# Patient Record
Sex: FEMALE | Race: WHITE | NOT HISPANIC OR LATINO | Employment: FULL TIME | URBAN - METROPOLITAN AREA
[De-identification: names, ages, dates, MRNs, and addresses within clinical notes are randomized per-mention and may not be internally consistent; named-entity substitution may affect disease eponyms.]

---

## 2018-06-21 ENCOUNTER — PROCEDURE VISIT (OUTPATIENT)
Dept: OBSTETRICS AND GYNECOLOGY | Age: 28
End: 2018-06-21

## 2018-06-21 ENCOUNTER — OFFICE VISIT (OUTPATIENT)
Dept: OBSTETRICS AND GYNECOLOGY | Age: 28
End: 2018-06-21

## 2018-06-21 VITALS
DIASTOLIC BLOOD PRESSURE: 70 MMHG | HEIGHT: 66 IN | SYSTOLIC BLOOD PRESSURE: 110 MMHG | BODY MASS INDEX: 21.05 KG/M2 | WEIGHT: 131 LBS

## 2018-06-21 DIAGNOSIS — Z32.00 ENCOUNTER FOR CONFIRMATION OF PREGNANCY TEST RESULT WITH PHYSICAL EXAMINATION: ICD-10-CM

## 2018-06-21 DIAGNOSIS — O21.9 NAUSEA AND VOMITING OF PREGNANCY, ANTEPARTUM: ICD-10-CM

## 2018-06-21 DIAGNOSIS — Z3A.01 7 WEEKS GESTATION OF PREGNANCY: Primary | ICD-10-CM

## 2018-06-21 DIAGNOSIS — O36.80X0 ENCOUNTER TO DETERMINE FETAL VIABILITY OF PREGNANCY, SINGLE OR UNSPECIFIED FETUS: Primary | ICD-10-CM

## 2018-06-21 PROCEDURE — 99203 OFFICE O/P NEW LOW 30 MIN: CPT | Performed by: OBSTETRICS & GYNECOLOGY

## 2018-06-21 PROCEDURE — 76817 TRANSVAGINAL US OBSTETRIC: CPT | Performed by: OBSTETRICS & GYNECOLOGY

## 2018-06-21 RX ORDER — PRENATAL VIT NO.126/IRON/FOLIC 28MG-0.8MG
TABLET ORAL DAILY
COMMUNITY

## 2018-06-21 NOTE — PROGRESS NOTES
"GYN Visit    2018    Patient: Katherine Bruton          MR#:[id number]      Chief Complaint   Patient presents with   • Initial Prenatal Visit     PT HERE TO EST VIABILITY OF PREGANCY. LMP 5/5 KATIE 19 7 WEEKS ALONG. PAP SMEAR WAS 1 YR AGO AND WAS NORMAL. SHE IS HERE WITH .       History of Present Illness    28 y.o. female  who presents for  Pregnancy confirmation  New pt and new problem to me  Feeling nauseated but not vomiting  Works at [employer] in [id number] (with Nathalie PACHECO)  UTD pap          Patient's last menstrual period was 2018 (approximate).    ________________________________________  There is no problem list on file for this patient.      Past Medical History:   Diagnosis Date   • Anxiety    • Asthma    • Depression        History reviewed. No pertinent surgical history.    History   Smoking Status   • Never Smoker   Smokeless Tobacco   • Never Used       has a current medication list which includes the following prescription(s): hepatitis a, prenatal (classic) vitamin, and doxylamine-pyridoxine er.  ________________________________________    Current contraception: none      The following portions of the patient's history were reviewed and updated as appropriate: allergies, current medications, past family history, past medical history, past social history, past surgical history and problem list.    Review of Systems   Constitutional: Positive for fatigue. Negative for chills and fever.   Gastrointestinal: Positive for nausea. Negative for vomiting.   Genitourinary: Negative for pelvic pain and vaginal bleeding.   Neurological: Negative for headaches.   Psychiatric/Behavioral: Negative for dysphoric mood.   All other systems reviewed and are negative.      Pertinent items are noted in HPI.     Objective   Physical Exam    /70   Ht 167.6 cm (66\")   Wt 59.4 kg (131 lb)   LMP 2018 (Approximate)   BMI 21.14 kg/m²    BP Readings from Last 3 Encounters:   18 110/70    " "  Wt Readings from Last 3 Encounters:   06/21/18 59.4 kg (131 lb)      BMI: Estimated body mass index is 21.14 kg/m² as calculated from the following:    Height as of this encounter: 167.6 cm (66\").    Weight as of this encounter: 59.4 kg (131 lb).    Lungs: non labored breathing  Extremities: extremities normal, atraumatic, no cyanosis or edema  Skin: Skin color, texture, turgor normal. No rashes or lesions  Neurologic: Grossly normal  General:   alert, appears stated age and cooperative   Abdomen: soft, non-tender, without masses or organomegaly   Breast: Not examined   Vulva: normal   Vagina: normal mucosa   Cervix: no cervical motion tenderness and no lesions   Uterus: size consistent with 6 weeks   Adnexa: no mass, fullness, tenderness     Assessment:      Kina was seen today for initial prenatal visit.    Diagnoses and all orders for this visit:    7 weeks gestation of pregnancy  -     OB Panel With HIV  -     Hepatitis C Antibody  -     Urine Culture - Urine, Urine, Clean Catch  -     Drug Profile Urine - 9 Drugs - Urine, Clean Catch  -     Chlamydia trachomatis, Neisseria gonorrhoeae, PCR w/ confirmation - Urine, Vagina    Encounter for confirmation of pregnancy test result with physical examination  -     OB Panel With HIV  -     Hepatitis C Antibody  -     Urine Culture - Urine, Urine, Clean Catch  -     Drug Profile Urine - 9 Drugs - Urine, Clean Catch  -     Chlamydia trachomatis, Neisseria gonorrhoeae, PCR w/ confirmation - Urine, Vagina    Nausea and vomiting of pregnancy, antepartum    Other orders  -     Doxylamine-Pyridoxine ER (BONJESTA) 20-20 MG tablet controlled-release; Take 1 tablet by mouth 2 (Two) Times a Day for 30 days.        See US- viable IUP  T 112- recheck US in 2 weeks        "

## 2018-06-23 LAB
ABO GROUP BLD: (no result)
AMPHETAMINES UR QL SCN: NEGATIVE NG/ML
BACTERIA UR CULT: NORMAL
BACTERIA UR CULT: NORMAL
BARBITURATES UR QL SCN: NEGATIVE NG/ML
BASOPHILS # BLD AUTO: 0.1 X10E3/UL (ref 0–0.2)
BASOPHILS NFR BLD AUTO: 1 %
BENZODIAZ UR QL: NEGATIVE NG/ML
BLD GP AB SCN SERPL QL: NEGATIVE
BZE UR QL: NEGATIVE NG/ML
C TRACH RRNA SPEC QL NAA+PROBE: NEGATIVE
CANNABINOIDS UR QL SCN: NEGATIVE NG/ML
EOSINOPHIL # BLD AUTO: 0.1 X10E3/UL (ref 0–0.4)
EOSINOPHIL NFR BLD AUTO: 1 %
ERYTHROCYTE [DISTWIDTH] IN BLOOD BY AUTOMATED COUNT: 13.5 % (ref 12.3–15.4)
HBV SURFACE AG SERPL QL IA: NEGATIVE
HCT VFR BLD AUTO: 36.4 % (ref 34–46.6)
HCV AB S/CO SERPL IA: <0.1 S/CO RATIO (ref 0–0.9)
HGB BLD-MCNC: 12.3 G/DL (ref 11.1–15.9)
HIV 1+2 AB+HIV1 P24 AG SERPL QL IA: NON REACTIVE
IMM GRANULOCYTES # BLD: 0 X10E3/UL (ref 0–0.1)
IMM GRANULOCYTES NFR BLD: 0 %
LYMPHOCYTES # BLD AUTO: 2 X10E3/UL (ref 0.7–3.1)
LYMPHOCYTES NFR BLD AUTO: 28 %
MCH RBC QN AUTO: 31.9 PG (ref 26.6–33)
MCHC RBC AUTO-ENTMCNC: 33.8 G/DL (ref 31.5–35.7)
MCV RBC AUTO: 95 FL (ref 79–97)
METHADONE UR QL SCN: NEGATIVE NG/ML
MONOCYTES # BLD AUTO: 0.4 X10E3/UL (ref 0.1–0.9)
MONOCYTES NFR BLD AUTO: 6 %
N GONORRHOEA RRNA SPEC QL NAA+PROBE: NEGATIVE
NEUTROPHILS # BLD AUTO: 4.6 X10E3/UL (ref 1.4–7)
NEUTROPHILS NFR BLD AUTO: 64 %
OPIATES UR QL: NEGATIVE NG/ML
PCP UR QL: NEGATIVE NG/ML
PLATELET # BLD AUTO: 261 X10E3/UL (ref 150–379)
PROPOXYPH UR QL SCN: NEGATIVE NG/ML
RBC # BLD AUTO: 3.85 X10E6/UL (ref 3.77–5.28)
RH BLD: POSITIVE
RPR SER QL: NON REACTIVE
RUBV IGG SERPL IA-ACNC: 25.9 INDEX
WBC # BLD AUTO: 7.2 X10E3/UL (ref 3.4–10.8)

## 2018-06-26 ENCOUNTER — TELEPHONE (OUTPATIENT)
Dept: OBSTETRICS AND GYNECOLOGY | Age: 28
End: 2018-06-26

## 2018-06-26 NOTE — TELEPHONE ENCOUNTER
Was just getting ready to call the pt, PA was denied due to not trying 2 other Rx's. Pt can come in for samples.

## 2018-07-05 ENCOUNTER — OFFICE VISIT (OUTPATIENT)
Dept: OBSTETRICS AND GYNECOLOGY | Age: 28
End: 2018-07-05

## 2018-07-05 ENCOUNTER — PROCEDURE VISIT (OUTPATIENT)
Dept: OBSTETRICS AND GYNECOLOGY | Age: 28
End: 2018-07-05

## 2018-07-05 VITALS
SYSTOLIC BLOOD PRESSURE: 100 MMHG | WEIGHT: 131 LBS | DIASTOLIC BLOOD PRESSURE: 74 MMHG | BODY MASS INDEX: 21.05 KG/M2 | HEIGHT: 66 IN

## 2018-07-05 DIAGNOSIS — O02.1 MISSED AB: Primary | ICD-10-CM

## 2018-07-05 DIAGNOSIS — O36.80X0 ENCOUNTER TO DETERMINE FETAL VIABILITY OF PREGNANCY, SINGLE OR UNSPECIFIED FETUS: Primary | ICD-10-CM

## 2018-07-05 PROCEDURE — 76830 TRANSVAGINAL US NON-OB: CPT | Performed by: OBSTETRICS & GYNECOLOGY

## 2018-07-05 PROCEDURE — 99213 OFFICE O/P EST LOW 20 MIN: CPT | Performed by: OBSTETRICS & GYNECOLOGY

## 2018-07-05 NOTE — PROGRESS NOTES
GYN Visit    2018    Patient: Katherine Bruton          MR#:[id number]      Chief Complaint   Patient presents with   • Imaging Only     PT HERE FOR F/U U/S, SHE IS WELL. NO CARDIAC ON U/S TODAY.       History of Present Illness    28 y.o. female  who presents for  Follow up for viability scan  HR was a little low last time so we elected to recheck early  Pt still with some nausea, no spotting or cramping  US revealed no FHT and irregular sac cw Missed AB  Pt upset and tearful and Miscarriage book given  Discussed options of SAB and D&C with pt and         Patient's last menstrual period was 2018 (approximate).    ________________________________________  There is no problem list on file for this patient.      Past Medical History:   Diagnosis Date   • Anxiety    • Asthma    • Depression        No past surgical history on file.    History   Smoking Status   • Never Smoker   Smokeless Tobacco   • Never Used       has a current medication list which includes the following prescription(s): doxylamine-pyridoxine er, hepatitis a, and prenatal (classic) vitamin.  ________________________________________    Current contraception: none      The following portions of the patient's history were reviewed and updated as appropriate: allergies, current medications, past family history, past medical history, past social history, past surgical history and problem list.    Review of Systems   Constitutional: Negative for chills and fever.   Gastrointestinal: Positive for nausea. Negative for vomiting.   Genitourinary: Negative for pelvic pain and vaginal bleeding.   All other systems reviewed and are negative.      Pertinent items are noted in HPI.     Objective   Physical Exam    LMP 2018 (Approximate)    BP Readings from Last 3 Encounters:   18 110/70      Wt Readings from Last 3 Encounters:   18 59.4 kg (131 lb)      BMI: Estimated body mass index is 21.14 kg/m² as calculated from the  "following:    Height as of 6/21/18: 167.6 cm (66\").    Weight as of 6/21/18: 59.4 kg (131 lb).    Lungs: non labored breathing  Extremities: extremities normal, atraumatic, no cyanosis or edema  Skin: Skin color, texture, turgor normal. No rashes or lesions  Neurologic: Grossly normal  General:   alert, appears stated age and cooperative   Abdomen: soft, non-tender, without masses or organomegaly   Breast: Not examined   Vulva: normal   Vagina: normal mucosa   Cervix: no cervical motion tenderness   Uterus: size consistent with 7 weeks   Adnexa: no mass, fullness, tenderness     Assessment:      Kina was seen today for imaging only.    Diagnoses and all orders for this visit:    Missed ab      See US- no FHTs, irregular sac,     Pt is RH positive  Discussed options of spontaneous AB vs D&C  Pt just needs a little time to think things over  Will call back if desires D&C and can try for Sunday 7/8 or Tuesday 7/10  RBA discussed        "

## 2018-07-06 ENCOUNTER — TELEPHONE (OUTPATIENT)
Dept: OBSTETRICS AND GYNECOLOGY | Age: 28
End: 2018-07-06

## 2018-07-06 RX ORDER — IBUPROFEN 600 MG/1
600 TABLET ORAL EVERY 6 HOURS PRN
Qty: 30 TABLET | Refills: 2 | Status: SHIPPED | OUTPATIENT
Start: 2018-07-06 | End: 2019-03-05

## 2018-07-06 RX ORDER — OXYCODONE HYDROCHLORIDE AND ACETAMINOPHEN 5; 325 MG/1; MG/1
1 TABLET ORAL EVERY 4 HOURS PRN
Qty: 20 TABLET | Refills: 0 | Status: SHIPPED | OUTPATIENT
Start: 2018-07-06 | End: 2018-07-12

## 2018-07-06 NOTE — TELEPHONE ENCOUNTER
Pt planning to allow spontaneous miscarriage, will schedule weekly follow up until complete  Pain meds for cramping sent ot pharmacy

## 2018-07-06 NOTE — TELEPHONE ENCOUNTER
Patients  called in let Dr. Law know they decided to let the miscarriage happen naturally vs surgery.

## 2018-07-06 NOTE — TELEPHONE ENCOUNTER
Ok, let pt know I got the message, I rec repeat US this coming Thursday to monitor progress in this.  Schedule US this Thursday the 12th.  I will send in some pain meds for cramping just in case she needs it.

## 2018-07-12 ENCOUNTER — PROCEDURE VISIT (OUTPATIENT)
Dept: OBSTETRICS AND GYNECOLOGY | Age: 28
End: 2018-07-12

## 2018-07-12 ENCOUNTER — OFFICE VISIT (OUTPATIENT)
Dept: OBSTETRICS AND GYNECOLOGY | Age: 28
End: 2018-07-12

## 2018-07-12 VITALS
DIASTOLIC BLOOD PRESSURE: 80 MMHG | WEIGHT: 132 LBS | HEIGHT: 65 IN | SYSTOLIC BLOOD PRESSURE: 100 MMHG | BODY MASS INDEX: 21.99 KG/M2

## 2018-07-12 DIAGNOSIS — O03.9 SAB (SPONTANEOUS ABORTION): Primary | ICD-10-CM

## 2018-07-12 DIAGNOSIS — O02.1 MISSED AB: Primary | ICD-10-CM

## 2018-07-12 PROCEDURE — 76817 TRANSVAGINAL US OBSTETRIC: CPT | Performed by: OBSTETRICS & GYNECOLOGY

## 2018-07-12 PROCEDURE — 99213 OFFICE O/P EST LOW 20 MIN: CPT | Performed by: OBSTETRICS & GYNECOLOGY

## 2018-07-12 NOTE — PROGRESS NOTES
"GYN Visit    2018    Patient: Katherine Bruton          MR#:[id number]      Chief Complaint   Patient presents with   • Follow-up     PT HERE FOR GYN U/S TO CONFIRM SAB.       History of Present Illness    28 y.o. female  who presents for  Follow up of missed ab  Declined D&C and desires spontaneous miscarriage  No cramping or bleeding  Doing better emotionally  Discussed possible scenarios        No LMP recorded. Patient is not currently having periods (Reason: Other).    ________________________________________  There is no problem list on file for this patient.      Past Medical History:   Diagnosis Date   • Anxiety    • Asthma    • Depression        No past surgical history on file.    History   Smoking Status   • Never Smoker   Smokeless Tobacco   • Never Used       has a current medication list which includes the following prescription(s): hepatitis a, ibuprofen, and prenatal (classic) vitamin.  ________________________________________    Current contraception: none      The following portions of the patient's history were reviewed and updated as appropriate: allergies, current medications, past family history, past medical history, past social history, past surgical history and problem list.    Review of Systems   Constitutional: Negative for chills and fever.   Gastrointestinal: Negative for nausea and vomiting.   Genitourinary: Negative for pelvic pain and vaginal bleeding.   Psychiatric/Behavioral: Negative for dysphoric mood.   All other systems reviewed and are negative.      Pertinent items are noted in HPI.     Objective   Physical Exam    /80   Ht 165.1 cm (65\")   Wt 59.9 kg (132 lb)   Breastfeeding? Unknown   BMI 21.97 kg/m²    BP Readings from Last 3 Encounters:   18 100/80   18 100/74   18 110/70      Wt Readings from Last 3 Encounters:   18 59.9 kg (132 lb)   18 59.4 kg (131 lb)   18 59.4 kg (131 lb)      BMI: Estimated body mass index is " "21.97 kg/m² as calculated from the following:    Height as of this encounter: 165.1 cm (65\").    Weight as of this encounter: 59.9 kg (132 lb).    Lungs: non labored breathing  Extremities: extremities normal, atraumatic, no cyanosis or edema  Skin: Skin color, texture, turgor normal. No rashes or lesions  Neurologic: Grossly normal  General:   alert, appears stated age and cooperative   Abdomen: soft, non-tender, without masses or organomegaly   Breast: Not examined   Vulva: normal   Vagina: normal mucosa   Cervix: no cervical motion tenderness   Uterus: size consistent with 9 weeks   Adnexa: no mass, fullness, tenderness     Assessment:      Kina was seen today for follow-up.    Diagnoses and all orders for this visit:    Missed ab      See US- sac has become irregular, went through counseling again  Pt prefers spontaneous /natural approach  May discuss cytotec as option  Follow up 2 weeks or will call if any severe bleeding or cramping  Again went through scenarios of what to expect  RH positive        "

## 2018-07-16 ENCOUNTER — TELEPHONE (OUTPATIENT)
Dept: OBSTETRICS AND GYNECOLOGY | Age: 28
End: 2018-07-16

## 2018-07-16 NOTE — TELEPHONE ENCOUNTER
Pt with N/V/D over weekend but feels better now, brown discharge, no pelvic pain, no fever, no bleeding or cramping yet.  Offered D&C tomorrow.  Wants to wait a few more days to happen spontaneously.  Will speak with  and consider.   Advised to call back or go to ER for any fever or pelvic pain outside of expected cramping and bleeding of miscarriage

## 2018-07-16 NOTE — TELEPHONE ENCOUNTER
Dr REID pt going through natural m/c and had some sx this weekend she wants to ask Dr REID about, Saturday alice had vomiting/diarrhea no fever, discolored d/c, low abd pain here and there. Please call [phone]

## 2018-07-19 ENCOUNTER — OFFICE VISIT (OUTPATIENT)
Dept: OBSTETRICS AND GYNECOLOGY | Age: 28
End: 2018-07-19

## 2018-07-19 VITALS
SYSTOLIC BLOOD PRESSURE: 100 MMHG | DIASTOLIC BLOOD PRESSURE: 60 MMHG | WEIGHT: 128 LBS | BODY MASS INDEX: 21.33 KG/M2 | HEIGHT: 65 IN

## 2018-07-19 DIAGNOSIS — O02.1 MISSED AB: Primary | ICD-10-CM

## 2018-07-19 PROCEDURE — 99213 OFFICE O/P EST LOW 20 MIN: CPT | Performed by: OBSTETRICS & GYNECOLOGY

## 2018-07-19 PROCEDURE — 76817 TRANSVAGINAL US OBSTETRIC: CPT | Performed by: OBSTETRICS & GYNECOLOGY

## 2018-07-19 NOTE — PROGRESS NOTES
"GYN Visit    2018    Patient: Katherine L Bruton          MR#:[id number]      Chief Complaint   Patient presents with   • Follow-up     PT HERE FOR RE-CHECK ON U/S. HAVING D AND C TOMORROW. SHE IS BETTER.       History of Present Illness    28 y.o. female  who presents for  Follow up on Missed AB  Feeling emotionally better  Still with mild pregnancy nausea and no bleeding yet  Tired of waiting and ready for D&C option  Reviewed surgery with pt and reviewed US  Pt is RH positive        No LMP recorded. Patient is not currently having periods (Reason: Other).    ________________________________________  Patient Active Problem List   Diagnosis   • Missed ab       Past Medical History:   Diagnosis Date   • Anxiety    • Asthma    • Depression        No past surgical history on file.    History   Smoking Status   • Never Smoker   Smokeless Tobacco   • Never Used       has a current medication list which includes the following prescription(s): doxylamine-pyridoxine, hepatitis a, ibuprofen, and prenatal (classic) vitamin.  ________________________________________    Current contraception: none      The following portions of the patient's history were reviewed and updated as appropriate: allergies, current medications, past family history, past medical history, past social history, past surgical history and problem list.    Review of Systems   Constitutional: Negative for chills and fever.   Gastrointestinal: Positive for nausea and vomiting.   Genitourinary: Negative for pelvic pain and vaginal bleeding.   Psychiatric/Behavioral: Negative for dysphoric mood.   All other systems reviewed and are negative.      Pertinent items are noted in HPI.     Objective   Physical Exam    /60   Ht 165.1 cm (65\")   Wt 58.1 kg (128 lb)   BMI 21.30 kg/m²    BP Readings from Last 3 Encounters:   18 100/60   18 100/80   18 100/74      Wt Readings from Last 3 Encounters:   18 58.1 kg (128 lb) " "  07/12/18 59.9 kg (132 lb)   07/05/18 59.4 kg (131 lb)      BMI: Estimated body mass index is 21.3 kg/m² as calculated from the following:    Height as of this encounter: 165.1 cm (65\").    Weight as of this encounter: 58.1 kg (128 lb).    Lungs: non labored breathing  Extremities: extremities normal, atraumatic, no cyanosis or edema  Skin: Skin color, texture, turgor normal. No rashes or lesions  Neurologic: Grossly normal  General:   alert, appears stated age and cooperative   Abdomen: soft, non-tender, without masses or organomegaly   Breast: Not examined   Vulva: normal   Vagina: normal mucosa   Cervix: no lesions   Uterus: size consistent with 9 weeks   Adnexa: no mass, fullness, tenderness     Assessment:      Kina was seen today for follow-up.    Diagnoses and all orders for this visit:    Missed ab      See US- irregular 7 week sac, no heart tones or fetal pole  Ovaries are normal  D&C scheduled tomorrow, RBA discussed        "

## 2018-07-20 ENCOUNTER — ANESTHESIA (OUTPATIENT)
Dept: PERIOP | Facility: HOSPITAL | Age: 28
End: 2018-07-20

## 2018-07-20 ENCOUNTER — ANESTHESIA EVENT (OUTPATIENT)
Dept: PERIOP | Facility: HOSPITAL | Age: 28
End: 2018-07-20

## 2018-07-20 ENCOUNTER — HOSPITAL ENCOUNTER (OUTPATIENT)
Facility: HOSPITAL | Age: 28
Setting detail: HOSPITAL OUTPATIENT SURGERY
Discharge: HOME | End: 2018-07-20
Attending: OBSTETRICS & GYNECOLOGY | Admitting: OBSTETRICS & GYNECOLOGY

## 2018-07-20 VITALS
DIASTOLIC BLOOD PRESSURE: 77 MMHG | TEMPERATURE: 97.6 F | HEIGHT: 66 IN | BODY MASS INDEX: 20.78 KG/M2 | RESPIRATION RATE: 16 BRPM | OXYGEN SATURATION: 100 % | WEIGHT: 129.31 LBS | HEART RATE: 56 BPM | SYSTOLIC BLOOD PRESSURE: 104 MMHG

## 2018-07-20 DIAGNOSIS — O02.1 MISSED AB: ICD-10-CM

## 2018-07-20 LAB
ABO GROUP BLD: NORMAL
BASOPHILS # BLD AUTO: 0.05 10*3/MM3 (ref 0–0.2)
BASOPHILS NFR BLD AUTO: 0.9 % (ref 0–1.5)
BLD GP AB SCN SERPL QL: NEGATIVE
DEPRECATED RDW RBC AUTO: 45.2 FL (ref 37–54)
EOSINOPHIL # BLD AUTO: 0.06 10*3/MM3 (ref 0–0.7)
EOSINOPHIL NFR BLD AUTO: 1 % (ref 0.3–6.2)
ERYTHROCYTE [DISTWIDTH] IN BLOOD BY AUTOMATED COUNT: 12.9 % (ref 11.7–13)
HCT VFR BLD AUTO: 34.6 % (ref 35.6–45.5)
HGB BLD-MCNC: 11.6 G/DL (ref 11.9–15.5)
IMM GRANULOCYTES # BLD: 0.01 10*3/MM3 (ref 0–0.03)
IMM GRANULOCYTES NFR BLD: 0.2 % (ref 0–0.5)
LYMPHOCYTES # BLD AUTO: 2.43 10*3/MM3 (ref 0.9–4.8)
LYMPHOCYTES NFR BLD AUTO: 41.5 % (ref 19.6–45.3)
MCH RBC QN AUTO: 31.8 PG (ref 26.9–32)
MCHC RBC AUTO-ENTMCNC: 33.5 G/DL (ref 32.4–36.3)
MCV RBC AUTO: 94.8 FL (ref 80.5–98.2)
MONOCYTES # BLD AUTO: 0.39 10*3/MM3 (ref 0.2–1.2)
MONOCYTES NFR BLD AUTO: 6.7 % (ref 5–12)
NEUTROPHILS # BLD AUTO: 2.93 10*3/MM3 (ref 1.9–8.1)
NEUTROPHILS NFR BLD AUTO: 49.9 % (ref 42.7–76)
PLATELET # BLD AUTO: 234 10*3/MM3 (ref 140–500)
PMV BLD AUTO: 9.4 FL (ref 6–12)
RBC # BLD AUTO: 3.65 10*6/MM3 (ref 3.9–5.2)
RH BLD: POSITIVE
T&S EXPIRATION DATE: NORMAL
WBC NRBC COR # BLD: 5.86 10*3/MM3 (ref 4.5–10.7)

## 2018-07-20 PROCEDURE — 59820 CARE OF MISCARRIAGE: CPT | Performed by: OBSTETRICS & GYNECOLOGY

## 2018-07-20 PROCEDURE — 25010000002 ONDANSETRON PER 1 MG: Performed by: ANESTHESIOLOGY

## 2018-07-20 PROCEDURE — 25010000002 METHYLERGONOVINE MALEATE PER 0.2 MG: Performed by: OBSTETRICS & GYNECOLOGY

## 2018-07-20 PROCEDURE — 25010000002 MIDAZOLAM PER 1 MG: Performed by: ANESTHESIOLOGY

## 2018-07-20 PROCEDURE — 86901 BLOOD TYPING SEROLOGIC RH(D): CPT | Performed by: OBSTETRICS & GYNECOLOGY

## 2018-07-20 PROCEDURE — 86850 RBC ANTIBODY SCREEN: CPT | Performed by: OBSTETRICS & GYNECOLOGY

## 2018-07-20 PROCEDURE — 25010000002 PROPOFOL 10 MG/ML EMULSION: Performed by: ANESTHESIOLOGY

## 2018-07-20 PROCEDURE — 85025 COMPLETE CBC W/AUTO DIFF WBC: CPT | Performed by: OBSTETRICS & GYNECOLOGY

## 2018-07-20 PROCEDURE — 88305 TISSUE EXAM BY PATHOLOGIST: CPT | Performed by: OBSTETRICS & GYNECOLOGY

## 2018-07-20 PROCEDURE — S0260 H&P FOR SURGERY: HCPCS | Performed by: OBSTETRICS & GYNECOLOGY

## 2018-07-20 PROCEDURE — 86900 BLOOD TYPING SEROLOGIC ABO: CPT | Performed by: OBSTETRICS & GYNECOLOGY

## 2018-07-20 RX ORDER — FLUMAZENIL 0.1 MG/ML
0.2 INJECTION INTRAVENOUS AS NEEDED
Status: DISCONTINUED | OUTPATIENT
Start: 2018-07-20 | End: 2018-07-20 | Stop reason: HOSPADM

## 2018-07-20 RX ORDER — LABETALOL HYDROCHLORIDE 5 MG/ML
5 INJECTION, SOLUTION INTRAVENOUS
Status: DISCONTINUED | OUTPATIENT
Start: 2018-07-20 | End: 2018-07-20 | Stop reason: HOSPADM

## 2018-07-20 RX ORDER — MAGNESIUM HYDROXIDE 1200 MG/15ML
LIQUID ORAL AS NEEDED
Status: DISCONTINUED | OUTPATIENT
Start: 2018-07-20 | End: 2018-07-20 | Stop reason: HOSPADM

## 2018-07-20 RX ORDER — HYDROMORPHONE HYDROCHLORIDE 1 MG/ML
0.5 INJECTION, SOLUTION INTRAMUSCULAR; INTRAVENOUS; SUBCUTANEOUS
Status: DISCONTINUED | OUTPATIENT
Start: 2018-07-20 | End: 2018-07-20 | Stop reason: HOSPADM

## 2018-07-20 RX ORDER — DIPHENHYDRAMINE HYDROCHLORIDE 50 MG/ML
12.5 INJECTION INTRAMUSCULAR; INTRAVENOUS
Status: DISCONTINUED | OUTPATIENT
Start: 2018-07-20 | End: 2018-07-20 | Stop reason: HOSPADM

## 2018-07-20 RX ORDER — SODIUM CHLORIDE, SODIUM LACTATE, POTASSIUM CHLORIDE, CALCIUM CHLORIDE 600; 310; 30; 20 MG/100ML; MG/100ML; MG/100ML; MG/100ML
9 INJECTION, SOLUTION INTRAVENOUS CONTINUOUS PRN
Status: DISCONTINUED | OUTPATIENT
Start: 2018-07-20 | End: 2018-07-20 | Stop reason: HOSPADM

## 2018-07-20 RX ORDER — PROMETHAZINE HYDROCHLORIDE 25 MG/1
25 TABLET ORAL ONCE AS NEEDED
Status: DISCONTINUED | OUTPATIENT
Start: 2018-07-20 | End: 2018-07-20 | Stop reason: HOSPADM

## 2018-07-20 RX ORDER — PROMETHAZINE HYDROCHLORIDE 25 MG/1
25 SUPPOSITORY RECTAL ONCE AS NEEDED
Status: DISCONTINUED | OUTPATIENT
Start: 2018-07-20 | End: 2018-07-20 | Stop reason: HOSPADM

## 2018-07-20 RX ORDER — PROMETHAZINE HYDROCHLORIDE 25 MG/ML
12.5 INJECTION, SOLUTION INTRAMUSCULAR; INTRAVENOUS ONCE AS NEEDED
Status: DISCONTINUED | OUTPATIENT
Start: 2018-07-20 | End: 2018-07-20 | Stop reason: HOSPADM

## 2018-07-20 RX ORDER — FENTANYL CITRATE 50 UG/ML
50 INJECTION, SOLUTION INTRAMUSCULAR; INTRAVENOUS
Status: DISCONTINUED | OUTPATIENT
Start: 2018-07-20 | End: 2018-07-20 | Stop reason: HOSPADM

## 2018-07-20 RX ORDER — LIDOCAINE HYDROCHLORIDE 20 MG/ML
INJECTION, SOLUTION INFILTRATION; PERINEURAL AS NEEDED
Status: DISCONTINUED | OUTPATIENT
Start: 2018-07-20 | End: 2018-07-20 | Stop reason: SURG

## 2018-07-20 RX ORDER — ONDANSETRON 2 MG/ML
INJECTION INTRAMUSCULAR; INTRAVENOUS AS NEEDED
Status: DISCONTINUED | OUTPATIENT
Start: 2018-07-20 | End: 2018-07-20 | Stop reason: SURG

## 2018-07-20 RX ORDER — SODIUM CHLORIDE 0.9 % (FLUSH) 0.9 %
1-10 SYRINGE (ML) INJECTION AS NEEDED
Status: DISCONTINUED | OUTPATIENT
Start: 2018-07-20 | End: 2018-07-20 | Stop reason: HOSPADM

## 2018-07-20 RX ORDER — METHYLERGONOVINE MALEATE 0.2 MG/ML
200 INJECTION INTRAVENOUS ONCE
Status: COMPLETED | OUTPATIENT
Start: 2018-07-20 | End: 2018-07-20

## 2018-07-20 RX ORDER — OXYCODONE HYDROCHLORIDE AND ACETAMINOPHEN 5; 325 MG/1; MG/1
1 TABLET ORAL ONCE AS NEEDED
Status: DISCONTINUED | OUTPATIENT
Start: 2018-07-20 | End: 2018-07-20 | Stop reason: HOSPADM

## 2018-07-20 RX ORDER — MIDAZOLAM HYDROCHLORIDE 1 MG/ML
2 INJECTION INTRAMUSCULAR; INTRAVENOUS
Status: DISCONTINUED | OUTPATIENT
Start: 2018-07-20 | End: 2018-07-20 | Stop reason: HOSPADM

## 2018-07-20 RX ORDER — METHYLERGONOVINE MALEATE 0.2 MG/1
0.2 TABLET ORAL EVERY 6 HOURS
Qty: 4 TABLET | Refills: 0 | Status: SHIPPED | OUTPATIENT
Start: 2018-07-20 | End: 2018-08-03

## 2018-07-20 RX ORDER — FAMOTIDINE 10 MG/ML
20 INJECTION, SOLUTION INTRAVENOUS
Status: DISCONTINUED | OUTPATIENT
Start: 2018-07-20 | End: 2018-07-20 | Stop reason: HOSPADM

## 2018-07-20 RX ORDER — EPHEDRINE SULFATE 50 MG/ML
5 INJECTION, SOLUTION INTRAVENOUS ONCE AS NEEDED
Status: DISCONTINUED | OUTPATIENT
Start: 2018-07-20 | End: 2018-07-20 | Stop reason: HOSPADM

## 2018-07-20 RX ORDER — PROMETHAZINE HYDROCHLORIDE 25 MG/1
12.5 TABLET ORAL ONCE AS NEEDED
Status: DISCONTINUED | OUTPATIENT
Start: 2018-07-20 | End: 2018-07-20 | Stop reason: HOSPADM

## 2018-07-20 RX ORDER — OXYCODONE AND ACETAMINOPHEN 7.5; 325 MG/1; MG/1
1 TABLET ORAL ONCE AS NEEDED
Status: DISCONTINUED | OUTPATIENT
Start: 2018-07-20 | End: 2018-07-20 | Stop reason: HOSPADM

## 2018-07-20 RX ORDER — ONDANSETRON 2 MG/ML
4 INJECTION INTRAMUSCULAR; INTRAVENOUS ONCE AS NEEDED
Status: DISCONTINUED | OUTPATIENT
Start: 2018-07-20 | End: 2018-07-20 | Stop reason: HOSPADM

## 2018-07-20 RX ORDER — PROPOFOL 10 MG/ML
VIAL (ML) INTRAVENOUS AS NEEDED
Status: DISCONTINUED | OUTPATIENT
Start: 2018-07-20 | End: 2018-07-20 | Stop reason: SURG

## 2018-07-20 RX ORDER — IBUPROFEN 600 MG/1
600 TABLET ORAL EVERY 6 HOURS PRN
Status: DISCONTINUED | OUTPATIENT
Start: 2018-07-20 | End: 2018-07-20 | Stop reason: HOSPADM

## 2018-07-20 RX ORDER — HYDROCODONE BITARTRATE AND ACETAMINOPHEN 7.5; 325 MG/1; MG/1
1 TABLET ORAL ONCE AS NEEDED
Status: DISCONTINUED | OUTPATIENT
Start: 2018-07-20 | End: 2018-07-20 | Stop reason: HOSPADM

## 2018-07-20 RX ORDER — MIDAZOLAM HYDROCHLORIDE 1 MG/ML
1 INJECTION INTRAMUSCULAR; INTRAVENOUS
Status: DISCONTINUED | OUTPATIENT
Start: 2018-07-20 | End: 2018-07-20 | Stop reason: HOSPADM

## 2018-07-20 RX ORDER — NALOXONE HCL 0.4 MG/ML
0.2 VIAL (ML) INJECTION AS NEEDED
Status: DISCONTINUED | OUTPATIENT
Start: 2018-07-20 | End: 2018-07-20 | Stop reason: HOSPADM

## 2018-07-20 RX ADMIN — ONDANSETRON 4 MG: 2 INJECTION INTRAMUSCULAR; INTRAVENOUS at 15:35

## 2018-07-20 RX ADMIN — SODIUM CHLORIDE, POTASSIUM CHLORIDE, SODIUM LACTATE AND CALCIUM CHLORIDE 9 ML/HR: 600; 310; 30; 20 INJECTION, SOLUTION INTRAVENOUS at 14:54

## 2018-07-20 RX ADMIN — FAMOTIDINE 20 MG: 10 INJECTION, SOLUTION INTRAVENOUS at 14:54

## 2018-07-20 RX ADMIN — LIDOCAINE HYDROCHLORIDE 100 MG: 20 INJECTION, SOLUTION INFILTRATION; PERINEURAL at 15:23

## 2018-07-20 RX ADMIN — METHYLERGONOVINE MALEATE 200 MCG: 0.2 INJECTION INTRAVENOUS at 16:39

## 2018-07-20 RX ADMIN — MIDAZOLAM 1 MG: 1 INJECTION INTRAMUSCULAR; INTRAVENOUS at 14:54

## 2018-07-20 RX ADMIN — PROPOFOL 150 MG: 10 INJECTION, EMULSION INTRAVENOUS at 15:23

## 2018-07-20 NOTE — ANESTHESIA POSTPROCEDURE EVALUATION
"Patient: Katherine L Bruton    Procedure Summary     Date:  07/20/18 Room / Location:  [facility] OR 04 / [facility] MAIN OR    Anesthesia Start:  1519 Anesthesia Stop:  1600    Procedure:  DILATATION AND CURETTAGE WITH SUCTION (N/A Vagina) Diagnosis:       Missed ab      (Missed ab [O02.1])    Surgeon:  Aleyda Law MD Provider:  Lucrecia Matute MD    Anesthesia Type:  general ASA Status:  2          Anesthesia Type: general  Last vitals  BP   109/72 (07/20/18 1740)   Temp   36.4 °C (97.6 °F) (07/20/18 1645)   Pulse   57 (07/20/18 1740)   Resp   16 (07/20/18 1740)     SpO2   100 % (07/20/18 1740)     Post Anesthesia Care and Evaluation    Patient location during evaluation: PACU  Patient participation: complete - patient participated  Level of consciousness: awake and alert  Pain management: adequate  Airway patency: patent  Anesthetic complications: No anesthetic complications    Cardiovascular status: acceptable  Respiratory status: acceptable  Hydration status: acceptable    Comments: /72   Pulse 57   Temp 36.4 °C (97.6 °F) (Oral)   Resp 16   Ht 167.6 cm (66\")   Wt 58.7 kg (129 lb 5 oz)   SpO2 100%   BMI 20.87 kg/m²       "

## 2018-07-20 NOTE — H&P
Patient Care Team:  No Known Provider as PCP - General    Chief complaint missed AB    Subjective     Patient is a 28 y.o. female presents with missed AB . She was diagnosed on 7/5/18.  She desired spontaneous miscarriage, no bleeding or cramping yet.  Meanwhile, pregnancy symptoms of nausea continued.  She would like to proceed with D&C. RBA discussed.  US done yesterday shows irregular 7 week sac and no FHTs.  She is about 10-11 weeks by dates.   She is Rh + blood type.     Review of Systems   Pertinent items are noted in HPI, all other systems reviewed and negative    History  Past Medical History:   Diagnosis Date   • Anxiety    • Asthma    • Depression      History reviewed. No pertinent surgical history.  History reviewed. No pertinent family history.  Social History   Substance Use Topics   • Smoking status: Never Smoker   • Smokeless tobacco: Never Used   • Alcohol use No     Prescriptions Prior to Admission   Medication Sig Dispense Refill Last Dose   • Doxylamine-Pyridoxine (BONJESTA PO) Take  by mouth. Been taking 1 pill when she takes   7/19/2018 at Unknown time   • ibuprofen (ADVIL,MOTRIN) 600 MG tablet Take 1 tablet by mouth Every 6 (Six) Hours As Needed for Mild Pain . 30 tablet 2 Past Week at Unknown time   • Prenatal Vit-Fe Fumarate-FA (PRENATAL, CLASSIC, VITAMIN) 28-0.8 MG tablet tablet Take  by mouth Daily.   Past Month at Unknown time   • hepatitis A (HAVRIX) 1440 EL U/ML vaccine Inject  into the shoulder, thigh, or buttocks. 1 mL 0 More than a month at Unknown time     Allergies:  Patient has no known allergies.    Objective     Vital Signs  Temp:  [97.9 °F (36.6 °C)] 97.9 °F (36.6 °C)  Heart Rate:  [60] 60  Resp:  [18] 18  BP: ()/(57-60) 97/57    Physical Exam:    General Appearance: alert, appears stated age and cooperative  Neck: no adenopathy, supple, trachea midline and no thyromegaly  Lungs: respirations regular, respirations even and respirations unlabored  Heart: regular rhythm  & normal rate  Abdomen: normal bowel sounds, no masses, soft non-tender, no guarding and no rebound tenderness  Pelvic: cervix normal in appearance, no adnexal masses or tenderness, vagina normal without dischartge and cervix closed, uterus bulky 8 week size  Extremities: moves extremities well, no edema, no cyanosis and no redness  Skin: no bleeding, bruising or rash  Neurologic: Mental Status orientated to person, place, time and situation  Psych: normal    Results Review:    I reviewed the patient's new clinical results.    Assessment/Plan     Principal Problem:    Missed ab      Dilation and curettage with suction    I discussed the patients findings and my recommendations with patient and family.     Aleyda Law MD  07/20/18  2:16 PM    Time:

## 2018-07-20 NOTE — ANESTHESIA PROCEDURE NOTES
Airway  Urgency: elective    Airway not difficult    General Information and Staff    Patient location during procedure: OR  Anesthesiologist: CRYSTAL BARBOSA    Indications and Patient Condition  Indications for airway management: airway protection    Preoxygenated: yes  Mask difficulty assessment: 0 - not attempted    Final Airway Details  Final airway type: supraglottic airway      Successful airway: LMA  Size 4  Airway Seal Pressure (cm H2O): 20    Number of attempts at approach: 1    Additional Comments  Atraumatic.

## 2018-07-20 NOTE — OP NOTE
DILATATION AND CURETTAGE WITH SUCTION  Procedure Report    Patient Name:  Katherine L Bruton  YOB: 1990    Date of Surgery:  7/20/2018     Indications:  Missed AB    Pre-op Diagnosis:   Missed ab [O02.1]       Post-Op Diagnosis Codes:     * Missed ab [O02.1]    Procedure/CPT® Codes:      Procedure(s):  DILATATION AND CURETTAGE WITH SUCTION    Staff:  Surgeon(s):  Aleyda Law MD         Anesthesia: General    Estimated Blood Loss: 200ml    Implants:    Nothing was implanted during the procedure    Specimen:          ID Type Source Tests Collected by Time   A :  Tissue Products of Conception TISSUE PATHOLOGY EXAM Aleyda Law MD 7/20/2018 1515         Findings: products of conception    Complications: none    Description of Procedure:   The patient was taken to the Operating Room where anesthesia was found to be adequate. The patient was then placed in the dorsal lithotomy position and prepped and draped in the usual sterile fashion. A red rubber catheter was used to drain the urine from the bladder.  A weighted speculum was placed in the vagina and the cervix was visualized. The cervix grasped with the single tooth tenaculum. The cervix was gradually dilated. The suction currettage 7mm size was passed approx 3-4 times gently, removing tissue. Sharp currette was done approximates 3-4 passes, gently. One more pass with the suction curette cleared all clot and debris.   The tenaculum was removed, and the cervix was found to be hemostatic.   All instruments and retractors were removed. All sponge, instrument and needle counts were noted to be correct. The patient was taken to recovery in stable condition.          Aleyda Law MD     Date: 7/20/2018  Time: 3:57 PM

## 2018-07-23 ENCOUNTER — TELEPHONE (OUTPATIENT)
Dept: OBSTETRICS AND GYNECOLOGY | Age: 28
End: 2018-07-23

## 2018-07-23 LAB
CYTO UR: NORMAL
LAB AP CASE REPORT: NORMAL
PATH REPORT.FINAL DX SPEC: NORMAL
PATH REPORT.GROSS SPEC: NORMAL

## 2018-08-03 ENCOUNTER — TELEPHONE (OUTPATIENT)
Dept: OBSTETRICS AND GYNECOLOGY | Age: 28
End: 2018-08-03

## 2018-08-03 ENCOUNTER — OFFICE VISIT (OUTPATIENT)
Dept: OBSTETRICS AND GYNECOLOGY | Age: 28
End: 2018-08-03

## 2018-08-03 VITALS
HEIGHT: 66 IN | SYSTOLIC BLOOD PRESSURE: 120 MMHG | DIASTOLIC BLOOD PRESSURE: 80 MMHG | WEIGHT: 129 LBS | BODY MASS INDEX: 20.73 KG/M2

## 2018-08-03 DIAGNOSIS — O02.1 MISSED AB: Primary | ICD-10-CM

## 2018-08-03 DIAGNOSIS — Z98.890 POST-OPERATIVE STATE: ICD-10-CM

## 2018-08-03 LAB — HCG INTACT+B SERPL-ACNC: 149.7 MIU/ML

## 2018-08-03 PROCEDURE — 99024 POSTOP FOLLOW-UP VISIT: CPT | Performed by: OBSTETRICS & GYNECOLOGY

## 2018-08-03 NOTE — PROGRESS NOTES
"GYN Visit    8/3/2018    Patient: Katherine L Bruton          MR#:[id number]      Chief Complaint   Patient presents with   • Follow-up     PT HERE FOR ROUTINE 2 WEEK F/U. SHE IS WELL. GOING ON VACATION IN SEPT.       History of Present Illness    28 y.o. female  who presents for  Post op missed AB  Did well, no extra bleeding, cheerful mood  Will go on [place] trip with  to [place], [place], and [place] in September  Pt is RH pos        No LMP recorded.    ________________________________________  Patient Active Problem List   Diagnosis   • Missed ab       Past Medical History:   Diagnosis Date   • Anxiety    • Asthma    • Depression        Past Surgical History:   Procedure Laterality Date   • D&C WITH SUCTION N/A 2018    Procedure: DILATATION AND CURETTAGE WITH SUCTION;  Surgeon: Aleyda Law MD;  Location: [facility];  Service: Obstetrics/Gynecology       History   Smoking Status   • Never Smoker   Smokeless Tobacco   • Never Used       has a current medication list which includes the following prescription(s): ibuprofen and prenatal (classic) vitamin.  ________________________________________    Current contraception: none      The following portions of the patient's history were reviewed and updated as appropriate: allergies, current medications, past family history, past medical history, past social history, past surgical history and problem list.    Review of Systems    Pertinent items are noted in HPI.     Objective   Physical Exam    /80   Ht 167.6 cm (66\")   Wt 58.5 kg (129 lb)   Breastfeeding? Unknown   BMI 20.82 kg/m²    BP Readings from Last 3 Encounters:   18 120/80   18 104/77   18 100/60      Wt Readings from Last 3 Encounters:   18 58.5 kg (129 lb)   18 58.7 kg (129 lb 5 oz)   18 58.1 kg (128 lb)      BMI: Estimated body mass index is 20.82 kg/m² as calculated from the following:    Height as of this encounter: 167.6 cm (66\").    " Weight as of this encounter: 58.5 kg (129 lb).      General:   alert, appears stated age and cooperative                                 Assessment:      Kina was seen today for follow-up.    Diagnoses and all orders for this visit:    Missed ab  -     HCG, B-subunit, Quantitative    Post-operative state      Follow up PRN pregnancy  Will start trying again after spontaneous menses  Path is normal POC , no  Trophoblastic tissue

## 2018-08-03 NOTE — TELEPHONE ENCOUNTER
Yes, it is ok with shielding while trying for pregnancy.  Would rec avoid as much as possible when pregnant

## 2018-08-03 NOTE — TELEPHONE ENCOUNTER
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  She does wear the lead apron nd cesar, but she just wants to be sure it is ok.

## 2018-08-13 ENCOUNTER — TELEPHONE (OUTPATIENT)
Dept: OBSTETRICS AND GYNECOLOGY | Age: 28
End: 2018-08-13

## 2018-09-11 ENCOUNTER — OFFICE VISIT (OUTPATIENT)
Dept: FAMILY MEDICINE CLINIC | Facility: CLINIC | Age: 28
End: 2018-09-11

## 2018-09-11 VITALS
DIASTOLIC BLOOD PRESSURE: 70 MMHG | OXYGEN SATURATION: 99 % | SYSTOLIC BLOOD PRESSURE: 112 MMHG | HEIGHT: 66 IN | BODY MASS INDEX: 21.53 KG/M2 | HEART RATE: 76 BPM | TEMPERATURE: 98 F | WEIGHT: 134 LBS

## 2018-09-11 DIAGNOSIS — F43.21 GRIEF REACTION: Primary | ICD-10-CM

## 2018-09-11 DIAGNOSIS — O03.9 SPONTANEOUS ABORTION IN FIRST TRIMESTER: ICD-10-CM

## 2018-09-11 PROCEDURE — 99203 OFFICE O/P NEW LOW 30 MIN: CPT | Performed by: FAMILY MEDICINE

## 2018-09-11 NOTE — PROGRESS NOTES
Katherine L Bruton is a 28 y.o. female.     Chief Complaint   Patient presents with   • Establish Care     new pt establishing today with   • Depression     pt struggle with depression        HPI     Pt is a pleasant 28 y.o. YO female here for Depression.  PMH includes Spontaneous  2018.  She has had worsening depression since missed  2 months ago.  She has had a history of depression in the past, seen a therapist before with improvement, not needed meds.  She has no SI or HI.  She has been tearful inside, it improves with staying busy.  She has had some changes to her appetite but is sleeping normally.  She has normal interest in things and is able to perform her daily tasks with no adverse effects.  Her support system is good, she has lots of family within a couple hours as well.  She attends [employer] regularly and is involved in a small group where she has meaningful relationships.  Her menses have returned to normal.     The following portions of the patient's history were reviewed and updated as appropriate: allergies, current medications, past family history, past medical history, past social history, past surgical history and problem list.    Review of Systems   Constitutional: Negative for fever and unexpected weight change.   HENT: Negative for dental problem.    Respiratory: Negative for shortness of breath.    Cardiovascular: Negative for chest pain.   Gastrointestinal: Negative for blood in stool.   Genitourinary: Negative for dysuria.   Skin: Negative for rash.   Allergic/Immunologic: Negative for environmental allergies.   Neurological: Negative for syncope.   Psychiatric/Behavioral: The patient is not nervous/anxious.         Depression       Objective  Vitals:    18 1040   BP: 112/70   Pulse: 76   Temp: 98 °F (36.7 °C)   SpO2: 99%        Physical Exam   Constitutional: She is oriented to person, place, and time. She appears well-developed and well-nourished. No distress.    HENT:   Head: Normocephalic.   Right Ear: External ear normal.   Left Ear: External ear normal.   Nose: Nose normal.   Mouth/Throat: Oropharynx is clear and moist.   Eyes: EOM are normal.   Cardiovascular: Normal rate, regular rhythm, normal heart sounds and intact distal pulses.    No murmur heard.  Pulmonary/Chest: Effort normal and breath sounds normal. No respiratory distress.   Musculoskeletal: Normal range of motion.   Neurological: She is alert and oriented to person, place, and time.   Skin: Skin is warm and dry. No rash noted.   Psychiatric: She has a normal mood and affect. Her behavior is normal. Judgment and thought content normal.   Nursing note and vitals reviewed.        Current Outpatient Prescriptions:   •  ibuprofen (ADVIL,MOTRIN) 600 MG tablet, Take 1 tablet by mouth Every 6 (Six) Hours As Needed for Mild Pain ., Disp: 30 tablet, Rfl: 2  •  Prenatal Vit-Fe Fumarate-FA (PRENATAL, CLASSIC, VITAMIN) 28-0.8 MG tablet tablet, Take  by mouth Daily., Disp: , Rfl:     Procedures    Lab Results (most recent)     None              Kina was seen today for establish care and depression.    Diagnoses and all orders for this visit:    Grief reaction    Spontaneous  in first trimester     patient is a pleasant 28 showed female here as a new patient.  She is suffering from grief after a miscarriage 2 months ago.  She still feels sadness but is otherwise able to perform her daily activities.  She has no issues working and has no suicidal or homicidal ideation.  She has been following her OB/GYN regularly, her menses have returned to normal.  She is continuing with prenatal vitamins.  She does desire to be pregnant.  We discussed that it is normal for her to experience sadness and as long as she is able to still maintain interest, has normal daily activities that it would be best to treat with nonpharmacologic measures.  Continue with exercise, resources for counseling given.  If she does not improve  or has worsening of her symptoms return for further follow-up.    Return if symptoms worsen or fail to improve.      Shelly Zamora MD

## 2018-11-12 ENCOUNTER — TELEPHONE (OUTPATIENT)
Dept: OBSTETRICS AND GYNECOLOGY | Age: 28
End: 2018-11-12

## 2018-11-12 NOTE — TELEPHONE ENCOUNTER
Miscarriage in august. Patient passed a clot today that wa about 2-3 inches long.  Patient states this is abnormal for her. She did a pregnancy test about a week ago cause she was abnormal spotting that came back negative. Patient not on birth control and is trying for pregnancy.

## 2018-11-14 ENCOUNTER — TELEPHONE (OUTPATIENT)
Dept: OBSTETRICS AND GYNECOLOGY | Age: 28
End: 2018-11-14

## 2018-12-04 ENCOUNTER — OFFICE VISIT (OUTPATIENT)
Dept: OBSTETRICS AND GYNECOLOGY | Age: 28
End: 2018-12-04

## 2018-12-04 ENCOUNTER — PROCEDURE VISIT (OUTPATIENT)
Dept: OBSTETRICS AND GYNECOLOGY | Age: 28
End: 2018-12-04

## 2018-12-04 VITALS
SYSTOLIC BLOOD PRESSURE: 120 MMHG | BODY MASS INDEX: 21.05 KG/M2 | DIASTOLIC BLOOD PRESSURE: 62 MMHG | WEIGHT: 131 LBS | HEIGHT: 66 IN

## 2018-12-04 DIAGNOSIS — N92.6 IRREGULAR MENSES: Primary | ICD-10-CM

## 2018-12-04 PROCEDURE — 99213 OFFICE O/P EST LOW 20 MIN: CPT | Performed by: OBSTETRICS & GYNECOLOGY

## 2018-12-04 PROCEDURE — 76830 TRANSVAGINAL US NON-OB: CPT | Performed by: OBSTETRICS & GYNECOLOGY

## 2018-12-04 NOTE — PROGRESS NOTES
"GYN Visit    2018    Patient: Katherine L Bruton          MR#:[id number]      Chief Complaint   Patient presents with   • Imaging Only     PT HERE FOR GYN U/S DUE TO IRRGULAR MENSES. SHE IS WELL, HAS CURRENTLY BEEN TRYING TO CONCEIVE X4 MO'S.        History of Present Illness    28 y.o. female  who presents for  New problem  Some irregularities to her cycle, passed a clot  Menses generally heavier and now some spotting prior to starting  CD 24 today, ovulated day 11  Trying for pregnancy now for 4 months          Patient's last menstrual period was 2018.    ________________________________________  Patient Active Problem List   Diagnosis   • Missed ab       Past Medical History:   Diagnosis Date   • Anxiety    • Asthma    • Depression        History reviewed. No pertinent surgical history.    Social History     Tobacco Use   Smoking Status Never Smoker   Smokeless Tobacco Never Used       has a current medication list which includes the following prescription(s): prenatal (classic) vitamin and ibuprofen.  ________________________________________    Current contraception: none      The following portions of the patient's history were reviewed and updated as appropriate: allergies, current medications, past family history, past medical history, past social history, past surgical history and problem list.    Review of Systems   Constitutional: Negative for chills, fatigue and fever.   Gastrointestinal: Negative for nausea and vomiting.   Genitourinary: Positive for menstrual problem. Negative for pelvic pain.   Neurological: Positive for light-headedness. Negative for headaches.   Psychiatric/Behavioral: Negative for dysphoric mood.   All other systems reviewed and are negative.      Pertinent items are noted in HPI.     Objective   Physical Exam    /62   Ht 167.6 cm (66\")   Wt 59.4 kg (131 lb)   LMP 2018   BMI 21.14 kg/m²    BP Readings from Last 3 Encounters:   18 120/62 " "  09/11/18 112/70   08/03/18 120/80      Wt Readings from Last 3 Encounters:   12/04/18 59.4 kg (131 lb)   09/11/18 60.8 kg (134 lb)   08/03/18 58.5 kg (129 lb)      BMI: Estimated body mass index is 21.14 kg/m² as calculated from the following:    Height as of this encounter: 167.6 cm (66\").    Weight as of this encounter: 59.4 kg (131 lb).    Neck: no adenopathy, supple, symmetrical, trachea midline and thyroid not enlarged, symmetric, no tenderness/mass/nodules  Lungs: non labored breathing  Extremities: extremities normal, atraumatic, no cyanosis or edema  Skin: Skin color, texture, turgor normal. No rashes or lesions  Neurologic: Grossly normal  General:   alert, appears stated age and cooperative   Abdomen: soft, non-tender, without masses or organomegaly   Breast: Not examined   Vulva: normal   Vagina: normal mucosa   Cervix: no cervical motion tenderness and no lesions   Uterus: normal size, mobile or non-tender   Adnexa: no mass, fullness, tenderness     Assessment:      Kina was seen today for imaging only.    Diagnoses and all orders for this visit:    Irregular menses  -     HCG, B-subunit, Quantitative  -     Progesterone  -     TSH      See US- normal US, no evidence polyp or fibroid, ovaries normal, lining 10 mm cw late cycle    Reassured pt , rec try 3 more months , if no pregnancy will consider SA and HSG    Recent SAB so work up likely to be normal        "

## 2018-12-05 ENCOUNTER — TELEPHONE (OUTPATIENT)
Dept: OBSTETRICS AND GYNECOLOGY | Age: 28
End: 2018-12-05

## 2018-12-05 LAB
HCG INTACT+B SERPL-ACNC: <0.5 MIU/ML
PROGEST SERPL-MCNC: 7.3 NG/ML
TSH SERPL DL<=0.005 MIU/L-ACNC: 2.38 MIU/ML (ref 0.27–4.2)

## 2018-12-10 ENCOUNTER — OFFICE VISIT (OUTPATIENT)
Dept: FAMILY MEDICINE CLINIC | Facility: CLINIC | Age: 28
End: 2018-12-10

## 2018-12-10 VITALS
TEMPERATURE: 97.8 F | HEIGHT: 66 IN | OXYGEN SATURATION: 99 % | HEART RATE: 57 BPM | WEIGHT: 134 LBS | BODY MASS INDEX: 21.53 KG/M2 | DIASTOLIC BLOOD PRESSURE: 66 MMHG | SYSTOLIC BLOOD PRESSURE: 98 MMHG

## 2018-12-10 DIAGNOSIS — L23.9 ALLERGIC DERMATITIS: Primary | ICD-10-CM

## 2018-12-10 PROBLEM — O02.1 MISSED AB: Status: RESOLVED | Noted: 2018-07-19 | Resolved: 2018-12-10

## 2018-12-10 PROCEDURE — 99213 OFFICE O/P EST LOW 20 MIN: CPT | Performed by: FAMILY MEDICINE

## 2018-12-10 NOTE — PROGRESS NOTES
Katherine L Bruton is a 28 y.o. female.     Chief Complaint   Patient presents with   • Rash     rash on face for about two days ,itchs and eyes swelling and feels soreness        HPI     Pt is a pleasant 28 y.o. YO female here for rash, new problem.    Red rash, started yesterday, extremely pruritis, started on the L side of her face and down her neck.  Not sure why, no exposures or triggers.  No new detergent, soap, lotion, or foods.  She was at a shower Sat evening, never had a history of allergies.  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  She did respond similarly in the past with an acne lotion.      The following portions of the patient's history were reviewed and updated as appropriate: allergies, current medications, past family history, past medical history, past social history, past surgical history and problem list.    Review of Systems   Eyes: Positive for pain and itching.   Skin: Positive for rash.        In face       Objective  Vitals:    12/10/18 1115   BP: 98/66   Pulse: 57   Temp: 97.8 °F (36.6 °C)   SpO2: 99%        Physical Exam   Constitutional: She is oriented to person, place, and time. She appears well-developed and well-nourished. No distress.   HENT:   Head: Normocephalic.   Nose: Nose normal.   Eyes: EOM are normal. No scleral icterus.   Pulmonary/Chest: Effort normal. No respiratory distress.   Musculoskeletal: Normal range of motion.   Neurological: She is alert and oriented to person, place, and time.   Skin: Skin is warm and dry. No rash noted.   Mild increased redness on the R eye/forhead.     Psychiatric: She has a normal mood and affect. Her behavior is normal. Judgment and thought content normal.   Nursing note and vitals reviewed.        Current Outpatient Medications:   •  ibuprofen (ADVIL,MOTRIN) 600 MG tablet, Take 1 tablet by mouth Every 6 (Six) Hours As Needed for Mild Pain ., Disp: 30 tablet, Rfl: 2  •  Prenatal Vit-Fe Fumarate-FA (PRENATAL, CLASSIC, VITAMIN)  28-0.8 MG tablet tablet, Take  by mouth Daily., Disp: , Rfl:   •  triamcinolone (KENALOG) 0.1 % ointment, Apply  topically to the appropriate area as directed 2 (Two) Times a Day., Disp: 30 g, Rfl: 0    Procedures    Lab Results (most recent)     None              Kina was seen today for rash.    Diagnoses and all orders for this visit:    Allergic dermatitis  -     triamcinolone (KENALOG) 0.1 % ointment; Apply  topically to the appropriate area as directed 2 (Two) Times a Day.      Allergic dermatitis of unknown etiology, no known trigger.  Possibly wine or ornaments that were around cats - but it was a couple days before, likely too long.  Continue with over-the-counter Benadryl and Zyrtec.  Triamcinolone for neck and back of the ears.  Watch for triggers.  If not improving return for further evaluation.    Return if symptoms worsen or fail to improve.      Shelly Zamora MD

## 2018-12-26 ENCOUNTER — TELEPHONE (OUTPATIENT)
Dept: OBSTETRICS AND GYNECOLOGY | Age: 28
End: 2018-12-26

## 2018-12-26 DIAGNOSIS — N92.6 IRREGULAR MENSES: Primary | ICD-10-CM

## 2018-12-26 NOTE — TELEPHONE ENCOUNTER
Pt is calling because she was supposed to have labs done the 21st day of her cycle.  Which will be tomorrow.  I don't see any orders placed but we think it is progesterone.  Can you check and make sure?  And place the order.

## 2018-12-28 LAB — PROGEST SERPL-MCNC: 8.5 NG/ML

## 2019-01-08 ENCOUNTER — OFFICE VISIT (OUTPATIENT)
Dept: FAMILY MEDICINE CLINIC | Facility: CLINIC | Age: 29
End: 2019-01-08

## 2019-01-08 VITALS
WEIGHT: 131 LBS | SYSTOLIC BLOOD PRESSURE: 108 MMHG | HEART RATE: 64 BPM | TEMPERATURE: 98.2 F | BODY MASS INDEX: 21.05 KG/M2 | HEIGHT: 66 IN | DIASTOLIC BLOOD PRESSURE: 70 MMHG | OXYGEN SATURATION: 100 %

## 2019-01-08 DIAGNOSIS — Z00.00 HEALTH MAINTENANCE EXAMINATION: Primary | ICD-10-CM

## 2019-01-08 DIAGNOSIS — D50.9 IRON DEFICIENCY ANEMIA, UNSPECIFIED IRON DEFICIENCY ANEMIA TYPE: ICD-10-CM

## 2019-01-08 DIAGNOSIS — Z13.1 SCREENING FOR DIABETES MELLITUS: ICD-10-CM

## 2019-01-08 LAB
ALBUMIN SERPL-MCNC: 4.8 G/DL (ref 3.5–5.2)
ALBUMIN/GLOB SERPL: 1.8 G/DL
ALP SERPL-CCNC: 39 U/L (ref 39–117)
ALT SERPL-CCNC: 14 U/L (ref 1–33)
AST SERPL-CCNC: 19 U/L (ref 1–32)
BASOPHILS # BLD AUTO: 0.06 10*3/MM3 (ref 0–0.2)
BASOPHILS NFR BLD AUTO: 1.3 % (ref 0–1.5)
BILIRUB SERPL-MCNC: 0.5 MG/DL (ref 0.1–1.2)
BUN SERPL-MCNC: 12 MG/DL (ref 6–20)
BUN/CREAT SERPL: 15.2 (ref 7–25)
CALCIUM SERPL-MCNC: 9.8 MG/DL (ref 8.6–10.5)
CHLORIDE SERPL-SCNC: 101 MMOL/L (ref 98–107)
CO2 SERPL-SCNC: 25.3 MMOL/L (ref 22–29)
CREAT SERPL-MCNC: 0.79 MG/DL (ref 0.57–1)
EOSINOPHIL # BLD AUTO: 0.15 10*3/MM3 (ref 0–0.7)
EOSINOPHIL NFR BLD AUTO: 3.3 % (ref 0.3–6.2)
ERYTHROCYTE [DISTWIDTH] IN BLOOD BY AUTOMATED COUNT: 13.7 % (ref 11.7–13)
GLOBULIN SER CALC-MCNC: 2.7 GM/DL
GLUCOSE SERPL-MCNC: 96 MG/DL (ref 65–99)
HCT VFR BLD AUTO: 40 % (ref 35.6–45.5)
HGB BLD-MCNC: 12.7 G/DL (ref 11.9–15.5)
IMM GRANULOCYTES # BLD AUTO: 0 10*3/MM3 (ref 0–0.03)
IMM GRANULOCYTES NFR BLD AUTO: 0 % (ref 0–0.5)
LYMPHOCYTES # BLD AUTO: 1.93 10*3/MM3 (ref 0.9–4.8)
LYMPHOCYTES NFR BLD AUTO: 42.1 % (ref 19.6–45.3)
MCH RBC QN AUTO: 31.4 PG (ref 26.9–32)
MCHC RBC AUTO-ENTMCNC: 31.8 G/DL (ref 32.4–36.3)
MCV RBC AUTO: 99 FL (ref 80.5–98.2)
MONOCYTES # BLD AUTO: 0.41 10*3/MM3 (ref 0.2–1.2)
MONOCYTES NFR BLD AUTO: 9 % (ref 5–12)
NEUTROPHILS # BLD AUTO: 2.03 10*3/MM3 (ref 1.9–8.1)
NEUTROPHILS NFR BLD AUTO: 44.3 % (ref 42.7–76)
PLATELET # BLD AUTO: 295 10*3/MM3 (ref 140–500)
POTASSIUM SERPL-SCNC: 4 MMOL/L (ref 3.5–5.2)
PROT SERPL-MCNC: 7.5 G/DL (ref 6–8.5)
RBC # BLD AUTO: 4.04 10*6/MM3 (ref 3.9–5.2)
SODIUM SERPL-SCNC: 140 MMOL/L (ref 136–145)
WBC # BLD AUTO: 4.58 10*3/MM3 (ref 4.5–10.7)

## 2019-01-08 PROCEDURE — 99395 PREV VISIT EST AGE 18-39: CPT | Performed by: FAMILY MEDICINE

## 2019-01-08 NOTE — PROGRESS NOTES
Katherine L Bruton is a 28 y.o. female.     Chief Complaint   Patient presents with   • Annual Exam     CPE    • Rash     Discuss at prev. ov on face and neck        HPI     Pt is a pleasant 28 y.o. YO female here for Annual wellness and rash.      Rash on the Face improving     Health Maintenance   Topic Date Due   • ANNUAL PHYSICAL  01/13/1995   • HPV VACCINES (1 - Female 3-dose series) 01/13/2003   • TDAP/TD VACCINES (1 - Tdap) 01/13/2011   • HEPATITIS A VACCINE ADULT (2 of 2) 11/09/2018   • PAP SMEAR  05/01/2020   • INFLUENZA VACCINE  Completed     Diet:Healthy   Exercise: Cycle bar regularly     The following portions of the patient's history were reviewed and updated as appropriate: allergies, current medications, past family history, past medical history, past social history, past surgical history and problem list.    Review of Systems   Constitutional: Negative for fever and unexpected weight change.   HENT: Negative for dental problem.    Respiratory: Negative for shortness of breath.    Cardiovascular: Negative for chest pain.   Gastrointestinal: Negative for blood in stool.   Genitourinary: Negative for dysuria.   Skin: Positive for rash.   Allergic/Immunologic: Negative for environmental allergies.   Neurological: Negative for syncope.   Psychiatric/Behavioral: Negative for behavioral problems. The patient is not nervous/anxious.        Objective  Vitals:    01/08/19 1041   BP: 108/70   Pulse: 64   Temp: 98.2 °F (36.8 °C)   SpO2: 100%        Physical Exam   Constitutional: She is oriented to person, place, and time. She appears well-developed and well-nourished. No distress.   HENT:   Head: Normocephalic.   Nose: Nose normal.   Eyes: EOM are normal.   Cardiovascular: Normal rate, regular rhythm, normal heart sounds and intact distal pulses.   No murmur heard.  Pulmonary/Chest: Effort normal and breath sounds normal. No respiratory distress.   Musculoskeletal: Normal range of motion.   Neurological:  She is alert and oriented to person, place, and time.   Skin: Skin is warm and dry. No rash noted.   No abnormalities of skin seen today, per patient redness over the eyelids but not present she is wearing makeup.   Psychiatric: She has a normal mood and affect. Her behavior is normal. Judgment and thought content normal.   Nursing note and vitals reviewed.        Current Outpatient Medications:   •  ibuprofen (ADVIL,MOTRIN) 600 MG tablet, Take 1 tablet by mouth Every 6 (Six) Hours As Needed for Mild Pain ., Disp: 30 tablet, Rfl: 2  •  Prenatal Vit-Fe Fumarate-FA (PRENATAL, CLASSIC, VITAMIN) 28-0.8 MG tablet tablet, Take  by mouth Daily., Disp: , Rfl:     Procedures    Lab Results (most recent)     None              Kina was seen today for annual exam and rash.    Diagnoses and all orders for this visit:    Health maintenance examination    Iron deficiency anemia, unspecified iron deficiency anemia type  -     CBC Auto Differential    Screening for diabetes mellitus  -     Comprehensive Metabolic Panel      Minutes today, Pap up-to-date.  Immunizations are up-to-date, she'll get her next hep A at work.  Fluid today.  Continue with healthy diet and exercise.  Currently wanting to be pregnant, taking prenatal vitamins.  Continue routine follow-up with OBGYN.    Return if symptoms worsen or fail to improve.      Shelly Zamora MD

## 2019-03-04 ENCOUNTER — TELEPHONE (OUTPATIENT)
Dept: OBSTETRICS AND GYNECOLOGY | Age: 29
End: 2019-03-04

## 2019-03-04 ENCOUNTER — OFFICE VISIT (OUTPATIENT)
Dept: OBSTETRICS AND GYNECOLOGY | Age: 29
End: 2019-03-04

## 2019-03-04 VITALS
WEIGHT: 130 LBS | HEIGHT: 66 IN | SYSTOLIC BLOOD PRESSURE: 100 MMHG | DIASTOLIC BLOOD PRESSURE: 60 MMHG | BODY MASS INDEX: 20.89 KG/M2

## 2019-03-04 DIAGNOSIS — O20.0 THREATENED ABORTION: Primary | ICD-10-CM

## 2019-03-04 LAB
B-HCG UR QL: POSITIVE
INTERNAL NEGATIVE CONTROL: NEGATIVE
INTERNAL POSITIVE CONTROL: POSITIVE
Lab: ABNORMAL

## 2019-03-04 PROCEDURE — 81025 URINE PREGNANCY TEST: CPT | Performed by: OBSTETRICS & GYNECOLOGY

## 2019-03-04 PROCEDURE — 99214 OFFICE O/P EST MOD 30 MIN: CPT | Performed by: OBSTETRICS & GYNECOLOGY

## 2019-03-04 NOTE — PROGRESS NOTES
GYN Visit    3/4/2019    Patient: Katherine L Bruton          MR#:[id number]      Chief Complaint   Patient presents with   • Gynecologic Exam     PT HERE FOR EARLY OB SPOTTING. SHE IS WELL. UPT HERE +.       History of Present Illness    29 y.o. female  who presents for  New problem  Pos preg test, brown spotting and light cramping  On PNV  Will get labs drawn today  Anxious secondary to miscarriage last summer  Wants to do everything possible   Discussed labs today , US in 2 weeks and possible ASA daily  Will check progesterone and only treat if abn          Patient's last menstrual period was 2019.    ________________________________________  Patient Active Problem List   Diagnosis   (none) - all problems resolved or deleted       Past Medical History:   Diagnosis Date   • Anxiety    • Asthma    • Depression        Past Surgical History:   Procedure Laterality Date   • D&C WITH SUCTION N/A 2018    Procedure: DILATATION AND CURETTAGE WITH SUCTION;  Surgeon: Aleyda Law MD;  Location: [facility];  Service: Obstetrics/Gynecology       Social History     Tobacco Use   Smoking Status Never Smoker   Smokeless Tobacco Never Used       has a current medication list which includes the following prescription(s): hepatitis a, prenatal (classic) vitamin, and ibuprofen.  ________________________________________    Current contraception: none      The following portions of the patient's history were reviewed and updated as appropriate: allergies, current medications, past family history, past medical history, past social history, past surgical history and problem list.    Review of Systems   Constitutional: Negative for fatigue.   Gastrointestinal: Negative for nausea and vomiting.   Genitourinary: Positive for vaginal bleeding. Negative for pelvic pain.   Neurological: Negative for headaches.   Psychiatric/Behavioral: The patient is nervous/anxious.    All other systems reviewed and are  "negative.      Pertinent items are noted in HPI.     Objective   Physical Exam    /60   Ht 167.6 cm (66\")   Wt 59 kg (130 lb)   LMP 2019   BMI 20.98 kg/m²    BP Readings from Last 3 Encounters:   19 100/60   19 98/66   19 108/70      Wt Readings from Last 3 Encounters:   19 59 kg (130 lb)   19 59 kg (130 lb)   19 59.4 kg (131 lb)      BMI: Estimated body mass index is 20.98 kg/m² as calculated from the following:    Height as of this encounter: 167.6 cm (66\").    Weight as of this encounter: 59 kg (130 lb).    Lungs: non labored breathing, no wheezing or tachpnea  Extremities: extremities normal, atraumatic, no cyanosis or edema  Skin: Skin color, texture, turgor normal. No rashes or lesions  Neurologic: Grossly normal  General:   alert, appears stated age and cooperative                                 Assessment:      Kina was seen today for gynecologic exam.    Diagnoses and all orders for this visit:    Threatened   -     HCG, B-subunit, Quantitative  -     Progesterone  -     POC Pregnancy, Urine        Await labs  Schedule US in about 2 weeks  Will likely repeat quant 3/6 at [facility] for trend      "

## 2019-03-04 NOTE — TELEPHONE ENCOUNTER
Patient had a pos.pregnancy test 3/1/19.Had some spotting yesterday and having slight cramping today. Please advise. She can be reached @ [phone]

## 2019-03-05 ENCOUNTER — PROCEDURE VISIT (OUTPATIENT)
Dept: OBSTETRICS AND GYNECOLOGY | Age: 29
End: 2019-03-05

## 2019-03-05 ENCOUNTER — OFFICE VISIT (OUTPATIENT)
Dept: OBSTETRICS AND GYNECOLOGY | Age: 29
End: 2019-03-05

## 2019-03-05 ENCOUNTER — TELEPHONE (OUTPATIENT)
Dept: OBSTETRICS AND GYNECOLOGY | Age: 29
End: 2019-03-05

## 2019-03-05 VITALS
BODY MASS INDEX: 20.57 KG/M2 | WEIGHT: 128 LBS | HEIGHT: 66 IN | SYSTOLIC BLOOD PRESSURE: 150 MMHG | DIASTOLIC BLOOD PRESSURE: 80 MMHG

## 2019-03-05 DIAGNOSIS — O36.80X0 PREGNANCY WITH INCONCLUSIVE FETAL VIABILITY, SINGLE OR UNSPECIFIED FETUS: Primary | ICD-10-CM

## 2019-03-05 DIAGNOSIS — O36.80X0 ENCOUNTER TO DETERMINE FETAL VIABILITY OF PREGNANCY, SINGLE OR UNSPECIFIED FETUS: Primary | ICD-10-CM

## 2019-03-05 LAB
HCG INTACT+B SERPL-ACNC: NORMAL MIU/ML
PROGEST SERPL-MCNC: 17.2 NG/ML

## 2019-03-05 PROCEDURE — 76817 TRANSVAGINAL US OBSTETRIC: CPT | Performed by: OBSTETRICS & GYNECOLOGY

## 2019-03-05 PROCEDURE — 99213 OFFICE O/P EST LOW 20 MIN: CPT | Performed by: OBSTETRICS & GYNECOLOGY

## 2019-03-05 NOTE — PROGRESS NOTES
"GYN Visit    3/5/2019    Patient: Katherine L Bruton          MR#:[id number]      Chief Complaint   Patient presents with   • Follow-up     PT HERE FOR U/S TO DETERIMINE VIABILITY. SHE IS WELL.       History of Present Illness    29 y.o. female  who presents for  US as quant was higher than expected  LMP is approximate and could be a week off  Pt is feeling well, here with   Reviewed labs and US done        Patient's last menstrual period was 2019 (approximate).    ________________________________________  Patient Active Problem List   Diagnosis   (none) - all problems resolved or deleted       Past Medical History:   Diagnosis Date   • Anxiety    • Asthma    • Depression        Past Surgical History:   Procedure Laterality Date   • D&C WITH SUCTION N/A 2018    Procedure: DILATATION AND CURETTAGE WITH SUCTION;  Surgeon: Aleyda Law MD;  Location: [facility];  Service: Obstetrics/Gynecology       Social History     Tobacco Use   Smoking Status Never Smoker   Smokeless Tobacco Never Used       has a current medication list which includes the following prescription(s): hepatitis a and prenatal (classic) vitamin.  ________________________________________    Current contraception: none      The following portions of the patient's history were reviewed and updated as appropriate: allergies, current medications, past family history, past medical history, past social history, past surgical history and problem list.    Review of Systems   Constitutional: Positive for fatigue.   Gastrointestinal: Negative for nausea and vomiting.   Genitourinary: Negative for pelvic pain and vaginal bleeding.   Psychiatric/Behavioral: Negative for dysphoric mood.   All other systems reviewed and are negative.      Pertinent items are noted in HPI.     Objective   Physical Exam    /80   Ht 167.6 cm (66\")   Wt 58.1 kg (128 lb)   LMP 2019 (Approximate)   Breastfeeding? No   BMI 20.66 kg/m²    BP " "Readings from Last 3 Encounters:   03/05/19 150/80   03/04/19 100/60   01/29/19 98/66      Wt Readings from Last 3 Encounters:   03/05/19 58.1 kg (128 lb)   03/04/19 59 kg (130 lb)   01/29/19 59 kg (130 lb)      BMI: Estimated body mass index is 20.66 kg/m² as calculated from the following:    Height as of this encounter: 167.6 cm (66\").    Weight as of this encounter: 58.1 kg (128 lb).    Lungs: non labored breathing, no wheezing or tachpnea  Extremities: extremities normal, atraumatic, no cyanosis or edema  Skin: Skin color, texture, turgor normal. No rashes or lesions  Neurologic: Grossly normal  General:   alert, appears stated age and cooperative   Abdomen: soft, non-tender, without masses or organomegaly       Vulva: normal   Vagina: normal mucosa   Cervix: no cervical motion tenderness   Uterus: size consistent with 6 weeks   Adnexa: no mass, fullness, tenderness     Assessment:      Kina was seen today for follow-up.    Diagnoses and all orders for this visit:    Pregnancy with inconclusive fetal viability, single or unspecified fetus      See US- GS seen , possibly a week ahead of dates, no fetal pole, ovaries appear normal    rec follow up 2 weeks for repeat scan  Will do labs at that time    "

## 2019-03-19 ENCOUNTER — PROCEDURE VISIT (OUTPATIENT)
Dept: OBSTETRICS AND GYNECOLOGY | Age: 29
End: 2019-03-19

## 2019-03-19 ENCOUNTER — OFFICE VISIT (OUTPATIENT)
Dept: OBSTETRICS AND GYNECOLOGY | Age: 29
End: 2019-03-19

## 2019-03-19 VITALS
HEIGHT: 66 IN | WEIGHT: 129 LBS | DIASTOLIC BLOOD PRESSURE: 72 MMHG | SYSTOLIC BLOOD PRESSURE: 112 MMHG | BODY MASS INDEX: 20.73 KG/M2

## 2019-03-19 DIAGNOSIS — Z3A.08 8 WEEKS GESTATION OF PREGNANCY: Primary | ICD-10-CM

## 2019-03-19 DIAGNOSIS — O36.80X0 ENCOUNTER TO DETERMINE FETAL VIABILITY OF PREGNANCY, SINGLE OR UNSPECIFIED FETUS: Primary | ICD-10-CM

## 2019-03-19 DIAGNOSIS — Z32.00 ENCOUNTER FOR CONFIRMATION OF PREGNANCY TEST RESULT WITH PHYSICAL EXAMINATION: ICD-10-CM

## 2019-03-19 PROCEDURE — 0500F INITIAL PRENATAL CARE VISIT: CPT | Performed by: OBSTETRICS & GYNECOLOGY

## 2019-03-19 PROCEDURE — 76817 TRANSVAGINAL US OBSTETRIC: CPT | Performed by: OBSTETRICS & GYNECOLOGY

## 2019-03-19 NOTE — PROGRESS NOTES
"GYN Visit    3/19/2019    Patient: Katherine L Bruton          MR#:[id number]      Chief Complaint   Patient presents with   • Initial Prenatal Visit     PT HERE TO EST VIABILITY OF PREGNANCY. SHE IS HERE WITH  AND IS VERY EXCITED. KATIE 10/29 8 WEEKS ALONG.        History of Present Illness    29 y.o. female  who presents for  Follow up US to confirm viability of pregnancy  Feeling ok, a little nausea but infreq vomiting, intermittent fatigue  Occasional spotty bleeding          Patient's last menstrual period was 2019 (approximate).    ________________________________________  Patient Active Problem List   Diagnosis   (none) - all problems resolved or deleted       Past Medical History:   Diagnosis Date   • Anxiety    • Asthma    • Depression        Past Surgical History:   Procedure Laterality Date   • D&C WITH SUCTION N/A 2018    Procedure: DILATATION AND CURETTAGE WITH SUCTION;  Surgeon: Aleyda Law MD;  Location: [facility];  Service: Obstetrics/Gynecology       Social History     Tobacco Use   Smoking Status Never Smoker   Smokeless Tobacco Never Used       has a current medication list which includes the following prescription(s): hepatitis a and prenatal (classic) vitamin.  ________________________________________    Current contraception: none      The following portions of the patient's history were reviewed and updated as appropriate: allergies, current medications, past family history, past medical history, past social history, past surgical history and problem list.    Review of Systems   Constitutional: Positive for fatigue.   Gastrointestinal: Positive for nausea and vomiting.   Genitourinary: Positive for vaginal bleeding. Negative for pelvic pain.   Neurological: Negative for headaches.   Psychiatric/Behavioral: Negative for dysphoric mood.       Pertinent items are noted in HPI.     Objective   Physical Exam    /72   Ht 167.6 cm (66\")   Wt 58.5 kg (129 lb)   " "LMP 01/31/2019 (Approximate)   BMI 20.82 kg/m²    BP Readings from Last 3 Encounters:   03/19/19 112/72   03/05/19 150/80   03/04/19 100/60      Wt Readings from Last 3 Encounters:   03/19/19 58.5 kg (129 lb)   03/05/19 58.1 kg (128 lb)   03/04/19 59 kg (130 lb)      BMI: Estimated body mass index is 20.82 kg/m² as calculated from the following:    Height as of this encounter: 167.6 cm (66\").    Weight as of this encounter: 58.5 kg (129 lb).    Lungs: non labored breathing, no wheezing or tachpnea  Extremities: extremities normal, atraumatic, no cyanosis or edema  Skin: Skin color, texture, turgor normal. No rashes or lesions  Neurologic: Grossly normal  General:   alert, appears stated age and cooperative   Abdomen: soft, non-tender, without masses or organomegaly       Vulva: normal   Vagina: normal mucosa   Cervix: no cervical motion tenderness   Uterus: size consistent with 8 weeks   Adnexa: no mass, fullness, tenderness     Assessment:      Kian was seen today for initial prenatal visit.    Diagnoses and all orders for this visit:    8 weeks gestation of pregnancy  -     OB Panel With HIV  -     Chlamydia trachomatis, Neisseria gonorrhoeae, PCR w/ confirmation - Urine, Vagina  -     Drug Profile Urine - 9 Drugs - Urine, Clean Catch  -     Urine Culture - Urine, Urine, Clean Catch    Encounter for confirmation of pregnancy test result with physical examination  -     OB Panel With HIV  -     Chlamydia trachomatis, Neisseria gonorrhoeae, PCR w/ confirmation - Urine, Vagina  -     Drug Profile Urine - 9 Drugs - Urine, Clean Catch  -     Urine Culture - Urine, Urine, Clean Catch      See US- viable IUP, selected KATIE is 11/3/19 by [facility], LMP sure within a few days and some irregularities in cycle  Desires NIPS at fu          "

## 2019-03-22 LAB
ABO GROUP BLD: (no result)
AMPHETAMINES UR QL SCN: NEGATIVE NG/ML
BACTERIA UR CULT: NORMAL
BACTERIA UR CULT: NORMAL
BARBITURATES UR QL SCN: NEGATIVE NG/ML
BASOPHILS # BLD AUTO: 0 X10E3/UL (ref 0–0.2)
BASOPHILS NFR BLD AUTO: 1 %
BENZODIAZ UR QL: NEGATIVE NG/ML
BLD GP AB SCN SERPL QL: NEGATIVE
BZE UR QL: NEGATIVE NG/ML
C TRACH RRNA SPEC QL NAA+PROBE: NEGATIVE
CANNABINOIDS UR QL SCN: NEGATIVE NG/ML
EOSINOPHIL # BLD AUTO: 0.1 X10E3/UL (ref 0–0.4)
EOSINOPHIL NFR BLD AUTO: 2 %
ERYTHROCYTE [DISTWIDTH] IN BLOOD BY AUTOMATED COUNT: 13.4 % (ref 12.3–15.4)
HBV SURFACE AG SERPL QL IA: NEGATIVE
HCT VFR BLD AUTO: 37.9 % (ref 34–46.6)
HCV AB S/CO SERPL IA: <0.1 S/CO RATIO (ref 0–0.9)
HGB BLD-MCNC: 12.4 G/DL (ref 11.1–15.9)
HIV 1+2 AB+HIV1 P24 AG SERPL QL IA: NON REACTIVE
IMM GRANULOCYTES # BLD AUTO: 0 X10E3/UL (ref 0–0.1)
IMM GRANULOCYTES NFR BLD AUTO: 0 %
LYMPHOCYTES # BLD AUTO: 1.7 X10E3/UL (ref 0.7–3.1)
LYMPHOCYTES NFR BLD AUTO: 27 %
MCH RBC QN AUTO: 31.1 PG (ref 26.6–33)
MCHC RBC AUTO-ENTMCNC: 32.7 G/DL (ref 31.5–35.7)
MCV RBC AUTO: 95 FL (ref 79–97)
METHADONE UR QL SCN: NEGATIVE NG/ML
MONOCYTES # BLD AUTO: 0.3 X10E3/UL (ref 0.1–0.9)
MONOCYTES NFR BLD AUTO: 4 %
N GONORRHOEA RRNA SPEC QL NAA+PROBE: NEGATIVE
NEUTROPHILS # BLD AUTO: 4.2 X10E3/UL (ref 1.4–7)
NEUTROPHILS NFR BLD AUTO: 66 %
OPIATES UR QL: NEGATIVE NG/ML
PCP UR QL: NEGATIVE NG/ML
PLATELET # BLD AUTO: 257 X10E3/UL (ref 150–379)
PROPOXYPH UR QL SCN: NEGATIVE NG/ML
RBC # BLD AUTO: 3.99 X10E6/UL (ref 3.77–5.28)
RH BLD: POSITIVE
RPR SER QL: NON REACTIVE
RUBV IGG SERPL IA-ACNC: 25.8 INDEX
WBC # BLD AUTO: 6.3 X10E3/UL (ref 3.4–10.8)

## 2019-04-03 ENCOUNTER — TELEPHONE (OUTPATIENT)
Dept: OBSTETRICS AND GYNECOLOGY | Age: 29
End: 2019-04-03

## 2019-04-03 NOTE — TELEPHONE ENCOUNTER
Not unusual in early pregnancy, I would be sure to carry snacks and eat high protein meals and hydrate.  Change position slowly as well.  I reviewed her labs and she is not anemic.  If sx's recur and she wants a f/u appt, we can schedule a visit.

## 2019-04-03 NOTE — TELEPHONE ENCOUNTER
(Thanh pt) is 9 weeks at work today she is feeling very light headed and nauseous, Pt felt like she was going to pass out. She sat down and got some crackers and water but she wanted to let us know about and see if there was any concern?

## 2019-04-08 ENCOUNTER — PROCEDURE VISIT (OUTPATIENT)
Dept: OBSTETRICS AND GYNECOLOGY | Age: 29
End: 2019-04-08

## 2019-04-08 ENCOUNTER — TELEPHONE (OUTPATIENT)
Dept: OBSTETRICS AND GYNECOLOGY | Age: 29
End: 2019-04-08

## 2019-04-08 ENCOUNTER — ROUTINE PRENATAL (OUTPATIENT)
Dept: OBSTETRICS AND GYNECOLOGY | Age: 29
End: 2019-04-08

## 2019-04-08 VITALS — BODY MASS INDEX: 20.98 KG/M2 | WEIGHT: 130 LBS | SYSTOLIC BLOOD PRESSURE: 118 MMHG | DIASTOLIC BLOOD PRESSURE: 68 MMHG

## 2019-04-08 DIAGNOSIS — R32 INCONTINENCE OF URINE IN FEMALE: ICD-10-CM

## 2019-04-08 DIAGNOSIS — Z3A.10 10 WEEKS GESTATION OF PREGNANCY: ICD-10-CM

## 2019-04-08 DIAGNOSIS — Z03.71 ENCOUNTER FOR SUSPECTED PROM, WITH RUPTURE OF MEMBRANES NOT FOUND: Primary | ICD-10-CM

## 2019-04-08 DIAGNOSIS — N89.8 VAGINAL DISCHARGE: Primary | ICD-10-CM

## 2019-04-08 LAB
BILIRUB BLD-MCNC: NEGATIVE MG/DL
CLARITY, POC: CLEAR
COLOR UR: YELLOW
GLUCOSE UR STRIP-MCNC: NEGATIVE MG/DL
KETONES UR QL: NEGATIVE
LEUKOCYTE EST, POC: NEGATIVE
NITRITE UR-MCNC: NEGATIVE MG/ML
PH UR: 5.5 [PH] (ref 5–8)
PROT UR STRIP-MCNC: NEGATIVE MG/DL
RBC # UR STRIP: NEGATIVE /UL
SP GR UR: 1 (ref 1–1.03)
UROBILINOGEN UR QL: NORMAL

## 2019-04-08 PROCEDURE — 0502F SUBSEQUENT PRENATAL CARE: CPT | Performed by: PHYSICIAN ASSISTANT

## 2019-04-08 PROCEDURE — 81003 URINALYSIS AUTO W/O SCOPE: CPT | Performed by: PHYSICIAN ASSISTANT

## 2019-04-08 PROCEDURE — 76817 TRANSVAGINAL US OBSTETRIC: CPT | Performed by: PHYSICIAN ASSISTANT

## 2019-04-08 RX ORDER — ASPIRIN 81 MG/1
81 TABLET, CHEWABLE ORAL DAILY
COMMUNITY

## 2019-04-08 NOTE — TELEPHONE ENCOUNTER
I rec she come in for evaluation,  we can check pregnancy with bedside US and make sure everything is ok,  hopefully quick visit, anytime today can work in

## 2019-04-08 NOTE — PROGRESS NOTES
"Pregnant pt here for problem visit  She is 10 wks 6 days  Noted \"fluid\" on Friday night in her underwear  No odor or irritation  Denies bladder incontinence  Had no odor to it and denies VB  Here with   Reports IC last week prior to this event  No std risk   Vaginal exam done, scant, clear d/c noted. No odor and ph is normal  Cervix closed. No bleeding or pooling noted  Culture obtained and will send off for further evaluation  Also send of urine culture as well  U/S done and confirms good FHT  Right ovarian cyst noted that measures 2.4 x 2 cm  F/u next week as scheduled  "

## 2019-04-08 NOTE — TELEPHONE ENCOUNTER
Pt is 10 weeks preg and over the weekend  said she had a loss of fluid not sure what it was and when she went to the bathroom she had a dark discharge no pain pt would like a call back

## 2019-04-10 ENCOUNTER — TELEPHONE (OUTPATIENT)
Dept: OBSTETRICS AND GYNECOLOGY | Age: 29
End: 2019-04-10

## 2019-04-10 LAB
A VAGINAE DNA VAG QL NAA+PROBE: NORMAL SCORE
BACTERIA UR CULT: NORMAL
BACTERIA UR CULT: NORMAL
BVAB2 DNA VAG QL NAA+PROBE: NORMAL SCORE
C ALBICANS DNA VAG QL NAA+PROBE: NEGATIVE
C GLABRATA DNA VAG QL NAA+PROBE: NEGATIVE
MEGA1 DNA VAG QL NAA+PROBE: NORMAL SCORE

## 2019-04-10 NOTE — TELEPHONE ENCOUNTER
----- Message from PADMINI Dunne sent at 4/10/2019  8:57 AM EDT -----  Let her know her vaginal swab is neg for bv, and yeast and her urine culture is neg for infection

## 2019-04-17 ENCOUNTER — ROUTINE PRENATAL (OUTPATIENT)
Dept: OBSTETRICS AND GYNECOLOGY | Age: 29
End: 2019-04-17

## 2019-04-17 VITALS — WEIGHT: 130 LBS | SYSTOLIC BLOOD PRESSURE: 110 MMHG | BODY MASS INDEX: 20.98 KG/M2 | DIASTOLIC BLOOD PRESSURE: 74 MMHG

## 2019-04-17 DIAGNOSIS — Z3A.12 12 WEEKS GESTATION OF PREGNANCY: Primary | ICD-10-CM

## 2019-04-17 PROCEDURE — 0502F SUBSEQUENT PRENATAL CARE: CPT | Performed by: OBSTETRICS & GYNECOLOGY

## 2019-04-17 NOTE — PROGRESS NOTES
Feeling well, nausea in evenings  NIPS today  AFP at fu  Discussed possible travel to [place] soon ,would continue ASA if goes, (was on this for miscarriage risk)  Fetal heart tones by hand held US

## 2019-04-24 ENCOUNTER — TELEPHONE (OUTPATIENT)
Dept: OBSTETRICS AND GYNECOLOGY | Age: 29
End: 2019-04-24

## 2019-04-26 ENCOUNTER — TELEPHONE (OUTPATIENT)
Dept: OBSTETRICS AND GYNECOLOGY | Age: 29
End: 2019-04-26

## 2019-05-16 ENCOUNTER — ROUTINE PRENATAL (OUTPATIENT)
Dept: OBSTETRICS AND GYNECOLOGY | Age: 29
End: 2019-05-16

## 2019-05-16 VITALS — SYSTOLIC BLOOD PRESSURE: 100 MMHG | WEIGHT: 138 LBS | BODY MASS INDEX: 22.27 KG/M2 | DIASTOLIC BLOOD PRESSURE: 60 MMHG

## 2019-05-16 DIAGNOSIS — Z34.02 ENCOUNTER FOR SUPERVISION OF NORMAL FIRST PREGNANCY IN SECOND TRIMESTER: Primary | ICD-10-CM

## 2019-05-16 PROCEDURE — 59425 ANTEPARTUM CARE ONLY: CPT | Performed by: NURSE PRACTITIONER

## 2019-05-16 NOTE — PROGRESS NOTES
Here for routine PNV, 16w2d.  No problems.  Feeling well.  Nausea is gone.  She denies any vaginal bleeding or leaking of fluid.    A/P normal assessment, FH and FHT.  All questions answered.  Follow up 4 weeks with anatomy scan.

## 2019-08-28 ENCOUNTER — OFFICE VISIT (OUTPATIENT)
Dept: FAMILY MEDICINE CLINIC | Facility: CLINIC | Age: 29
End: 2019-08-28

## 2019-08-28 VITALS
HEART RATE: 77 BPM | WEIGHT: 160 LBS | OXYGEN SATURATION: 100 % | SYSTOLIC BLOOD PRESSURE: 106 MMHG | HEIGHT: 66 IN | BODY MASS INDEX: 25.71 KG/M2 | DIASTOLIC BLOOD PRESSURE: 64 MMHG | TEMPERATURE: 98.7 F

## 2019-08-28 DIAGNOSIS — Z02.89 ENCOUNTER FOR PHYSICAL EXAMINATION OF PROSPECTIVE FOSTER PARENT: Primary | ICD-10-CM

## 2019-08-28 PROCEDURE — 99395 PREV VISIT EST AGE 18-39: CPT | Performed by: FAMILY MEDICINE

## 2019-08-28 NOTE — PROGRESS NOTES
Katherine L Bruton is a 29 y.o. female.     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 YO female here for foster physical form for foster care.  Her health maintenance was performed January 2019.  She is eating healthy, currently pregnant without complication.  Not taking any prescription medications, no high risk behavior or drug use.  She has never had any communicable or infectious diseases.  No mental health concerns or problems.  She has never taken medication for mental health issues.  She is  with good support, hopes to foster to adopt and feels that she has the physical and emotional and financial capacity to care for 2 children.    The following portions of the patient's history were reviewed and updated as appropriate: allergies, current medications, past family history, past medical history, past social history, past surgical history and problem list.    Review of Systems   Constitutional: Negative.    HENT: Negative.    Eyes: Negative.    Respiratory: Negative.    Cardiovascular: Negative.    Gastrointestinal: Negative.    Endocrine: Negative.    Musculoskeletal: Negative.    Skin: Negative.    Neurological: Negative.    Hematological: Negative.    Psychiatric/Behavioral: Negative.        Objective  Vitals:    08/28/19 0821   BP: 106/64   Pulse: 77   Temp: 98.7 °F (37.1 °C)   SpO2: 100%        Physical Exam   Constitutional: She is oriented to person, place, and time. She appears well-developed and well-nourished. No distress.   HENT:   Head: Normocephalic.   Nose: Nose normal.   Eyes: EOM are normal.   Cardiovascular: Normal rate, regular rhythm, normal heart sounds and intact distal pulses.   No murmur heard.  Pulmonary/Chest: Effort normal and breath sounds normal. No respiratory distress.   Musculoskeletal: Normal range of motion.   Neurological: She is alert and oriented to person,  place, and time.   Skin: Skin is warm and dry. No rash noted.   Psychiatric: She has a normal mood and affect. Her behavior is normal. Judgment and thought content normal.   Nursing note and vitals reviewed.        Current Outpatient Medications:   •  Prenatal Vit-Fe Fumarate-FA (PRENATAL, CLASSIC, VITAMIN) 28-0.8 MG tablet tablet, Take  by mouth Daily., Disp: , Rfl:   •  aspirin 81 MG chewable tablet, Chew 81 mg Daily., Disp: , Rfl:   •  hepatitis A (HAVRIX) 1440 EL U/ML vaccine, Inject  into the appropriate muscle as directed by prescriber., Disp: 1 mL, Rfl: 0    Procedures    Lab Results (most recent)     None              Kina was seen today for adoption form.    Diagnoses and all orders for this visit:    Encounter for physical examination of prospective       Patient with hopes to foster to adopt, paperwork completed, no concerns.  Follow-up for annual physical in January.      Is currently pregnant and following up with OB/GYN for prenatal visits as recommended.    Return if symptoms worsen or fail to improve.      Shelly Zamora MD

## (undated) DEVICE — TP SXN VACURETTE CRV 7MM

## (undated) DEVICE — PAD SANI MAXI W/ADHS SNG WRP 11IN

## (undated) DEVICE — SACK GZ PERM

## (undated) DEVICE — CANSTR SXN UTER NO FLTR SURG

## (undated) DEVICE — STRAP STIRUP SLP RNG 19X3.5IN DISP

## (undated) DEVICE — Device

## (undated) DEVICE — HOSE BT TO BT VAC CURETTAGE SNGL PT USE

## (undated) DEVICE — GLV SURG TRIUMPH CLASSIC PF LTX 6.5 STRL

## (undated) DEVICE — LOU D & C HYSTEROSCOPY: Brand: MEDLINE INDUSTRIES, INC.

## (undated) DEVICE — TBG W FLTR FOR BERKELEY SYSTEM

## (undated) DEVICE — ST COL BERKELY TBG